# Patient Record
Sex: MALE | Race: WHITE | NOT HISPANIC OR LATINO | URBAN - METROPOLITAN AREA
[De-identification: names, ages, dates, MRNs, and addresses within clinical notes are randomized per-mention and may not be internally consistent; named-entity substitution may affect disease eponyms.]

---

## 2017-05-04 ENCOUNTER — INPATIENT (INPATIENT)
Facility: HOSPITAL | Age: 77
LOS: 4 days | Discharge: HOME HEALTH | DRG: 603 | End: 2017-05-09
Attending: HOSPITALIST | Admitting: HOSPITALIST
Payer: MEDICARE

## 2017-05-04 VITALS
WEIGHT: 250 LBS | HEART RATE: 60 BPM | RESPIRATION RATE: 20 BRPM | TEMPERATURE: 97 F | HEIGHT: 74 IN | SYSTOLIC BLOOD PRESSURE: 187 MMHG | DIASTOLIC BLOOD PRESSURE: 80 MMHG | OXYGEN SATURATION: 99 %

## 2017-05-04 PROBLEM — Z00.00 ENCOUNTER FOR PREVENTIVE HEALTH EXAMINATION: Status: ACTIVE | Noted: 2017-05-04

## 2017-05-04 LAB
ALBUMIN SERPL ELPH-MCNC: 3.9 G/DL — SIGNIFICANT CHANGE UP (ref 3.3–5.2)
ALP SERPL-CCNC: 121 U/L — HIGH (ref 40–120)
ALT FLD-CCNC: 46 U/L — HIGH
ANION GAP SERPL CALC-SCNC: 11 MMOL/L — SIGNIFICANT CHANGE UP (ref 5–17)
APTT BLD: 35.5 SEC — SIGNIFICANT CHANGE UP (ref 27.5–37.4)
AST SERPL-CCNC: 51 U/L — HIGH
BILIRUB SERPL-MCNC: 1.2 MG/DL — SIGNIFICANT CHANGE UP (ref 0.4–2)
BUN SERPL-MCNC: 24 MG/DL — HIGH (ref 8–20)
CALCIUM SERPL-MCNC: 9 MG/DL — SIGNIFICANT CHANGE UP (ref 8.6–10.2)
CHLORIDE SERPL-SCNC: 99 MMOL/L — SIGNIFICANT CHANGE UP (ref 98–107)
CO2 SERPL-SCNC: 27 MMOL/L — SIGNIFICANT CHANGE UP (ref 22–29)
CREAT SERPL-MCNC: 1.32 MG/DL — HIGH (ref 0.5–1.3)
GLUCOSE SERPL-MCNC: 97 MG/DL — SIGNIFICANT CHANGE UP (ref 70–115)
HCT VFR BLD CALC: 43.7 % — SIGNIFICANT CHANGE UP (ref 42–52)
HGB BLD-MCNC: 15 G/DL — SIGNIFICANT CHANGE UP (ref 14–18)
INR BLD: 1.18 RATIO — HIGH (ref 0.88–1.16)
MCHC RBC-ENTMCNC: 33.6 PG — HIGH (ref 27–31)
MCHC RBC-ENTMCNC: 34.3 G/DL — SIGNIFICANT CHANGE UP (ref 32–36)
MCV RBC AUTO: 97.8 FL — HIGH (ref 80–94)
NT-PROBNP SERPL-SCNC: 1518 PG/ML — HIGH (ref 0–300)
PLATELET # BLD AUTO: 110 K/UL — LOW (ref 150–400)
POTASSIUM SERPL-MCNC: 5.2 MMOL/L — SIGNIFICANT CHANGE UP (ref 3.5–5.3)
POTASSIUM SERPL-SCNC: 5.2 MMOL/L — SIGNIFICANT CHANGE UP (ref 3.5–5.3)
PROT SERPL-MCNC: 7.5 G/DL — SIGNIFICANT CHANGE UP (ref 6.6–8.7)
PROTHROM AB SERPL-ACNC: 13 SEC — HIGH (ref 9.8–12.7)
RBC # BLD: 4.47 M/UL — LOW (ref 4.6–6.2)
RBC # FLD: 14 % — SIGNIFICANT CHANGE UP (ref 11–15.6)
SODIUM SERPL-SCNC: 137 MMOL/L — SIGNIFICANT CHANGE UP (ref 135–145)
WBC # BLD: 9.4 K/UL — SIGNIFICANT CHANGE UP (ref 4.8–10.8)
WBC # FLD AUTO: 9.4 K/UL — SIGNIFICANT CHANGE UP (ref 4.8–10.8)

## 2017-05-04 PROCEDURE — 93971 EXTREMITY STUDY: CPT | Mod: 26,LT

## 2017-05-04 PROCEDURE — 71020: CPT | Mod: 26

## 2017-05-04 PROCEDURE — 73590 X-RAY EXAM OF LOWER LEG: CPT | Mod: 26,LT

## 2017-05-04 PROCEDURE — 99285 EMERGENCY DEPT VISIT HI MDM: CPT

## 2017-05-04 RX ORDER — SODIUM CHLORIDE 9 MG/ML
3 INJECTION INTRAMUSCULAR; INTRAVENOUS; SUBCUTANEOUS EVERY 8 HOURS
Qty: 0 | Refills: 0 | Status: DISCONTINUED | OUTPATIENT
Start: 2017-05-04 | End: 2017-05-09

## 2017-05-04 RX ORDER — VANCOMYCIN HCL 1 G
1000 VIAL (EA) INTRAVENOUS ONCE
Qty: 0 | Refills: 0 | Status: COMPLETED | OUTPATIENT
Start: 2017-05-04 | End: 2017-05-05

## 2017-05-04 RX ADMIN — SODIUM CHLORIDE 3 MILLILITER(S): 9 INJECTION INTRAMUSCULAR; INTRAVENOUS; SUBCUTANEOUS at 20:48

## 2017-05-04 NOTE — ED PROVIDER NOTE - MUSCULOSKELETAL, MLM
LLE from knee down is swollen and tender, increased warmth in anterior aspect of leg, + pulses, slightly delayed cap refill, posterior calf tenderness

## 2017-05-04 NOTE — ED PROVIDER NOTE - OBJECTIVE STATEMENT
75 y/o M presents to the ED c/o constant lower L leg swelling, pain and redness that began 6 days ago. Pt denies trauma or injury to the area, he states that he woke up with his sx. Pt states he sought PMD evaluation of his sx and prescribed abx which he has taken without any relief in his sx. He reports having outpt Doppler of his L leg 4 days ago which found no blood clots. Pt reports some SOB as well. Denies fever, chills, abd pain, n/v/d, HA, blurred vision, CP. Denies hx of CHF. Pt has not had outpt XR of leg. No further complaints at this time. PMD is Dr. Marcelino. 75 y/o M presents to the ED c/o constant lower L leg swelling, pain and redness that began 6 days ago. Pt denies trauma or injury to the area, he states that he woke up with his sx. Pt states he sought PMD evaluation of his sx and prescribed abx which he has taken without any relief in his sx. He reports having outpt Doppler of his L leg 4 days ago which found no blood clots. Pt reports some SOB as well. Denies fever, chills, abd pain, n/v/d, HA, blurred vision, CP. Denies hx of CHF. Pt has not had outpt XR of leg. Pt states that he is currently on blood thinners. No further complaints at this time. PMD is Dr. Marcelino.

## 2017-05-04 NOTE — ED PROVIDER NOTE - NS ED MD SCRIBE ATTENDING SCRIBE SECTIONS
PHYSICAL EXAM/DISPOSITION/HISTORY OF PRESENT ILLNESS/REVIEW OF SYSTEMS/PAST MEDICAL/SURGICAL/SOCIAL HISTORY/VITAL SIGNS( [place])

## 2017-05-04 NOTE — ED PROVIDER NOTE - MEDICAL DECISION MAKING DETAILS
Will check labs, Doppler of leg and re-evaluate Will check labs, Doppler of leg and re-evaluate   pt failing outpt antibiotics will need admission and workup for possible chf

## 2017-05-05 DIAGNOSIS — L03.90 CELLULITIS, UNSPECIFIED: ICD-10-CM

## 2017-05-05 DIAGNOSIS — E03.9 HYPOTHYROIDISM, UNSPECIFIED: ICD-10-CM

## 2017-05-05 DIAGNOSIS — I48.91 UNSPECIFIED ATRIAL FIBRILLATION: ICD-10-CM

## 2017-05-05 DIAGNOSIS — L03.116 CELLULITIS OF LEFT LOWER LIMB: ICD-10-CM

## 2017-05-05 DIAGNOSIS — Z29.9 ENCOUNTER FOR PROPHYLACTIC MEASURES, UNSPECIFIED: ICD-10-CM

## 2017-05-05 DIAGNOSIS — I10 ESSENTIAL (PRIMARY) HYPERTENSION: ICD-10-CM

## 2017-05-05 DIAGNOSIS — M1A.9XX0 CHRONIC GOUT, UNSPECIFIED, WITHOUT TOPHUS (TOPHI): ICD-10-CM

## 2017-05-05 DIAGNOSIS — I50.9 HEART FAILURE, UNSPECIFIED: ICD-10-CM

## 2017-05-05 PROCEDURE — 99223 1ST HOSP IP/OBS HIGH 75: CPT

## 2017-05-05 RX ORDER — APIXABAN 2.5 MG/1
5 TABLET, FILM COATED ORAL
Qty: 0 | Refills: 0 | Status: DISCONTINUED | OUTPATIENT
Start: 2017-05-05 | End: 2017-05-09

## 2017-05-05 RX ORDER — VANCOMYCIN HCL 1 G
1000 VIAL (EA) INTRAVENOUS EVERY 12 HOURS
Qty: 0 | Refills: 0 | Status: DISCONTINUED | OUTPATIENT
Start: 2017-05-05 | End: 2017-05-05

## 2017-05-05 RX ORDER — ACETAMINOPHEN 500 MG
650 TABLET ORAL EVERY 6 HOURS
Qty: 0 | Refills: 0 | Status: DISCONTINUED | OUTPATIENT
Start: 2017-05-05 | End: 2017-05-09

## 2017-05-05 RX ORDER — LACTOBACILLUS ACIDOPHILUS 100MM CELL
1 CAPSULE ORAL
Qty: 0 | Refills: 0 | Status: DISCONTINUED | OUTPATIENT
Start: 2017-05-05 | End: 2017-05-09

## 2017-05-05 RX ORDER — LEVOTHYROXINE SODIUM 125 MCG
50 TABLET ORAL DAILY
Qty: 0 | Refills: 0 | Status: DISCONTINUED | OUTPATIENT
Start: 2017-05-05 | End: 2017-05-09

## 2017-05-05 RX ORDER — LINEZOLID 600 MG/300ML
600 INJECTION, SOLUTION INTRAVENOUS EVERY 12 HOURS
Qty: 0 | Refills: 0 | Status: DISCONTINUED | OUTPATIENT
Start: 2017-05-05 | End: 2017-05-09

## 2017-05-05 RX ORDER — ONDANSETRON 8 MG/1
4 TABLET, FILM COATED ORAL EVERY 6 HOURS
Qty: 0 | Refills: 0 | Status: DISCONTINUED | OUTPATIENT
Start: 2017-05-05 | End: 2017-05-05

## 2017-05-05 RX ORDER — ALLOPURINOL 300 MG
100 TABLET ORAL
Qty: 0 | Refills: 0 | Status: DISCONTINUED | OUTPATIENT
Start: 2017-05-05 | End: 2017-05-09

## 2017-05-05 RX ORDER — ATENOLOL 25 MG/1
50 TABLET ORAL DAILY
Qty: 0 | Refills: 0 | Status: DISCONTINUED | OUTPATIENT
Start: 2017-05-05 | End: 2017-05-09

## 2017-05-05 RX ORDER — SOTALOL HCL 120 MG
80 TABLET ORAL
Qty: 0 | Refills: 0 | Status: DISCONTINUED | OUTPATIENT
Start: 2017-05-05 | End: 2017-05-09

## 2017-05-05 RX ORDER — LOSARTAN POTASSIUM 100 MG/1
50 TABLET, FILM COATED ORAL DAILY
Qty: 0 | Refills: 0 | Status: DISCONTINUED | OUTPATIENT
Start: 2017-05-05 | End: 2017-05-06

## 2017-05-05 RX ORDER — DILTIAZEM HCL 120 MG
180 CAPSULE, EXT RELEASE 24 HR ORAL DAILY
Qty: 0 | Refills: 0 | Status: DISCONTINUED | OUTPATIENT
Start: 2017-05-05 | End: 2017-05-09

## 2017-05-05 RX ORDER — IBUPROFEN 200 MG
400 TABLET ORAL THREE TIMES A DAY
Qty: 0 | Refills: 0 | Status: DISCONTINUED | OUTPATIENT
Start: 2017-05-05 | End: 2017-05-06

## 2017-05-05 RX ORDER — VANCOMYCIN HCL 1 G
1000 VIAL (EA) INTRAVENOUS EVERY 8 HOURS
Qty: 0 | Refills: 0 | Status: DISCONTINUED | OUTPATIENT
Start: 2017-05-05 | End: 2017-05-05

## 2017-05-05 RX ADMIN — Medication 100 MILLIGRAM(S): at 18:26

## 2017-05-05 RX ADMIN — LINEZOLID 600 MILLIGRAM(S): 600 INJECTION, SOLUTION INTRAVENOUS at 18:26

## 2017-05-05 RX ADMIN — Medication 80 MILLIGRAM(S): at 18:27

## 2017-05-05 RX ADMIN — SODIUM CHLORIDE 3 MILLILITER(S): 9 INJECTION INTRAMUSCULAR; INTRAVENOUS; SUBCUTANEOUS at 21:48

## 2017-05-05 RX ADMIN — Medication 80 MILLIGRAM(S): at 05:21

## 2017-05-05 RX ADMIN — APIXABAN 5 MILLIGRAM(S): 2.5 TABLET, FILM COATED ORAL at 05:21

## 2017-05-05 RX ADMIN — Medication 50 MICROGRAM(S): at 05:21

## 2017-05-05 RX ADMIN — Medication 100 MILLIGRAM(S): at 09:40

## 2017-05-05 RX ADMIN — SODIUM CHLORIDE 3 MILLILITER(S): 9 INJECTION INTRAMUSCULAR; INTRAVENOUS; SUBCUTANEOUS at 14:36

## 2017-05-05 RX ADMIN — ATENOLOL 50 MILLIGRAM(S): 25 TABLET ORAL at 05:21

## 2017-05-05 RX ADMIN — Medication 1 TABLET(S): at 09:40

## 2017-05-05 RX ADMIN — SODIUM CHLORIDE 3 MILLILITER(S): 9 INJECTION INTRAMUSCULAR; INTRAVENOUS; SUBCUTANEOUS at 05:18

## 2017-05-05 RX ADMIN — APIXABAN 5 MILLIGRAM(S): 2.5 TABLET, FILM COATED ORAL at 18:25

## 2017-05-05 RX ADMIN — Medication 250 MILLIGRAM(S): at 00:53

## 2017-05-05 RX ADMIN — Medication 180 MILLIGRAM(S): at 05:21

## 2017-05-05 RX ADMIN — LOSARTAN POTASSIUM 50 MILLIGRAM(S): 100 TABLET, FILM COATED ORAL at 05:21

## 2017-05-05 RX ADMIN — Medication 1 TABLET(S): at 18:26

## 2017-05-05 NOTE — H&P ADULT - HISTORY OF PRESENT ILLNESS
77 y/o male with history of Afib on A/C, HTN, HLD, Gout, Hypothyroidism, remote history of colon cancer s/p surgery/chemo 6 years ago. Patient saw PMD 6 days ago with left leg redness, warmth, pain and was told he has a cellulitis. He was given Cleocin which he has been taking but has not noticed any improvement. He denies any fever, chills, N/V, SOB, CP. He denies any known history of MRSA infection. In the ED patient with evidence of left leg cellulitis, not sepsis criteria. O/P xray and U/S showed no FB or DVT.

## 2017-05-05 NOTE — H&P ADULT - PMH
Atrial fibrillation, unspecified type    Chronic gout, unspecified cause, unspecified site    Essential hypertension

## 2017-05-05 NOTE — H&P ADULT - NEUROLOGICAL DETAILS
responds to pain/responds to verbal commands/alert and oriented x 3/sensation intact/cranial nerves intact

## 2017-05-05 NOTE — PROGRESS NOTE ADULT - SUBJECTIVE AND OBJECTIVE BOX
Patient is a 76y old  Male who presents with a chief complaint of left leg pain (05 May 2017 01:52)      HPI:  75 y/o male with history of Afib on A/C, HTN, HLD, Gout, Hypothyroidism, remote history of colon cancer s/p surgery/chemo 6 years ago. Patient saw PMD 6 days ago with left leg redness, warmth, pain and was told he has a cellulitis. He was given Cleocin which he has been taking but has not noticed any improvement. He denies any fever, chills, N/V, SOB, CP. He denies any known history of MRSA infection. In the ED patient with evidence of left leg cellulitis, not sepsis criteria. O/P xray and U/S showed no FB or DVT.      Overnight interval:  L leg pain subsided considerably, but + swelling unchanged  Pt.denies chills/fever, SOB, CP, palpitations, diaphoresis, abd.pain, N/V/D/C, ha/dizziness, and rest of ROS negative          FAMILY HISTORY:  No pertinent family history in first degree relatives      PAST MEDICAL & SURGICAL HISTORY:  Essential hypertension  Chronic gout, unspecified cause, unspecified site  Atrial fibrillation, unspecified type  No significant past surgical history      Allergies    No Known Allergies      Vital Signs Last 24 Hrs  T(C): 36.6, Max: 36.9 (05-05 @ 01:52)  T(F): 97.9, Max: 98.4 (05-05 @ 01:52)  HR: 61 (60 - 102)  BP: 151/71 (145/70 - 187/80)  BP(mean): 115 (115 - 115)  RR: 18 (16 - 20)  SpO2: 98% (98% - 100%)          LABS:                        15.0   9.4   )-----------( 110      ( 04 May 2017 21:01 )             43.7     LIVER FUNCTIONS - ( 04 May 2017 21:01 )  Alb: 3.9 g/dL / Pro: 7.5 g/dL / ALK PHOS: 121 U/L / ALT: 46 U/L / AST: 51 U/L / GGT: x           PT/INR - ( 04 May 2017 21:01 )   PT: 13.0 sec;   INR: 1.18 ratio         PTT - ( 04 May 2017 21:01 )  PTT:35.5 sec      RADIOLOGY & ADDITIONAL STUDIES:    US DPLX LWR EXT VEINS LTD LT      No evidence of left lower extremity deep vein thrombosis.       TIBIA FIBULA-LEFT         No acute osseous abnormality.      CHEST P.A. LATERAL    No acute cardiopulmonary disease process.    MEDICATIONS:  sodium chloride 0.9% lock flush 3milliLiter(s) IV Push every 8 hours  lactobacillus acidophilus 1Tablet(s) Oral two times a day with meals  apixaban 5milliGRAM(s) Oral two times a day  allopurinol 100milliGRAM(s) Oral two times a day after meals  losartan 50milliGRAM(s) Oral daily  sotalol 80milliGRAM(s) Oral two times a day  diltiazem   CD 180milliGRAM(s) Oral daily  levothyroxine 50MICROGram(s) Oral daily  ATENolol  Tablet 50milliGRAM(s) Oral daily  acetaminophen   Tablet 650milliGRAM(s) Oral every 6 hours PRN  linezolid    Tablet 600milliGRAM(s) Oral every 12 hours

## 2017-05-05 NOTE — PROGRESS NOTE ADULT - PROBLEM SELECTOR PLAN 1
Failed outpt Clindamycin tx  Vancomycin 1000mg IVPB Q12H  Acidophillus PO BID   Keep toes dry and clean  Ambulation  OOB  Duplex LLE-- No evidence of left lower extremity deep vein thrombosis  Monitor vitals (afebrile T=98.4, WBC= 9.4, no drainage from LE  Monitor CBC  Tylenol PRN-- pain  MotrinPRN-- pain Failed outpt Clindamycin tx  Vancomycin 1000mg IVPB Q12H  Acidophillus PO BID   ID Consult  Keep toes dry and clean  Ambulation  OOB  Duplex LLE-- No evidence of left lower extremity deep vein thrombosis  Monitor vitals (afebrile T=98.4, WBC= 9.4, no drainage from LE  Monitor CBC  Tylenol PRN-- pain  Motrin PRN-- pain  Blood c&s Failed outpt PO Clindamycin tx  Vancomycin 1000mg IVPB Q12H ---- D/Shaq   Linezolid 600mg PO [id number] (5-7 days)   Pt. instructed to avoid cheese, and wine while on abx  Acidophillus PO BID   ID Consult appreciated  Keep toes dry and clean  Ambulation  OOB  Duplex LLE-- No evidence of left lower extremity deep vein thrombosis  Monitor vitals (afebrile T=98.4, WBC= 9.4, no drainage from LE  Monitor CBC  Tylenol PRN-- pain  Motrin PRN-- pain  Blood c&s -- pending

## 2017-05-05 NOTE — H&P ADULT - PROBLEM SELECTOR PLAN 1
Patient failed treatment with Clindamycin c/f MRSA. No drainage to cultures, no WBC or fever. Cont. with Vancomycin. No reason to suspect Gout flair. likely cause is onychomycosis leading to skin breakdown and translocation of skin reynold. Cont. Probiotics, Ambulation, OOB. Monitor temp/wbc.

## 2017-05-05 NOTE — PROGRESS NOTE ADULT - EXTREMITIES COMMENTS
LLE edema 2+/3+, L shin cellulitis extending to upper leg+erythema LLE, + warmth, + sensitivity, ROM BLE intact  + resolving bruising L foot/medial ankle spread into the foot arch, +DP  trophic skin changes RLE, overly dry, erytematous B/L toes, R>L  B/L onychomychosis

## 2017-05-05 NOTE — PROGRESS NOTE ADULT - PROBLEM SELECTOR PLAN 7
DVT prx-- Eliquis 5mg PO BID DVT prx-- Eliquis 5mg PO BID  Keep toes dry and clean  Ambulation  OOB  Tylenol/Motrin PRN for pain

## 2017-05-05 NOTE — ED ADULT NURSE REASSESSMENT NOTE - NS ED NURSE REASSESS COMMENT FT1
Patient received at 0700; awake; alert and oriented x4. Denies any pain or discomfort at this time. redness to LLE noted, + pulse.  Denies SOB, dizziness. No distress noted. VSS. Respirations unlabored. Report received at bedside.  Call bell and personal items in reach. Continue to monitor patient and maintain safety.

## 2017-05-05 NOTE — CONSULT NOTE ADULT - PROBLEM SELECTOR RECOMMENDATION 9
Blood cultures pending  No fever or leukocytosis  Cellulitis of LLE on exam  follow up blood cultures  will D/C Vancomycin  Start Linezolid 600mg PO [id number]  This will also help to decrease toxin production  Anticipate 5-7 days  will D/C Zofran since patient is not using it or nauseous   No other Linezolid interaction  Patient advised to avoid Wine and cheese on Linezolid  radiology images reviewed

## 2017-05-06 LAB
ANION GAP SERPL CALC-SCNC: 11 MMOL/L — SIGNIFICANT CHANGE UP (ref 5–17)
BUN SERPL-MCNC: 26 MG/DL — HIGH (ref 8–20)
CALCIUM SERPL-MCNC: 8.2 MG/DL — LOW (ref 8.6–10.2)
CHLORIDE SERPL-SCNC: 105 MMOL/L — SIGNIFICANT CHANGE UP (ref 98–107)
CO2 SERPL-SCNC: 25 MMOL/L — SIGNIFICANT CHANGE UP (ref 22–29)
CREAT SERPL-MCNC: 1.38 MG/DL — HIGH (ref 0.5–1.3)
GLUCOSE SERPL-MCNC: 97 MG/DL — SIGNIFICANT CHANGE UP (ref 70–115)
HCT VFR BLD CALC: 38.9 % — LOW (ref 42–52)
HGB BLD-MCNC: 13.3 G/DL — LOW (ref 14–18)
MCHC RBC-ENTMCNC: 33.1 PG — HIGH (ref 27–31)
MCHC RBC-ENTMCNC: 34.2 G/DL — SIGNIFICANT CHANGE UP (ref 32–36)
MCV RBC AUTO: 96.8 FL — HIGH (ref 80–94)
PLATELET # BLD AUTO: 87 K/UL — LOW (ref 150–400)
POTASSIUM SERPL-MCNC: 3.9 MMOL/L — SIGNIFICANT CHANGE UP (ref 3.5–5.3)
POTASSIUM SERPL-SCNC: 3.9 MMOL/L — SIGNIFICANT CHANGE UP (ref 3.5–5.3)
RBC # BLD: 4.02 M/UL — LOW (ref 4.6–6.2)
RBC # FLD: 13.7 % — SIGNIFICANT CHANGE UP (ref 11–15.6)
SODIUM SERPL-SCNC: 141 MMOL/L — SIGNIFICANT CHANGE UP (ref 135–145)
WBC # BLD: 8.1 K/UL — SIGNIFICANT CHANGE UP (ref 4.8–10.8)
WBC # FLD AUTO: 8.1 K/UL — SIGNIFICANT CHANGE UP (ref 4.8–10.8)

## 2017-05-06 PROCEDURE — 99233 SBSQ HOSP IP/OBS HIGH 50: CPT

## 2017-05-06 RX ORDER — SODIUM CHLORIDE 9 MG/ML
1000 INJECTION, SOLUTION INTRAVENOUS
Qty: 0 | Refills: 0 | Status: DISCONTINUED | OUTPATIENT
Start: 2017-05-06 | End: 2017-05-09

## 2017-05-06 RX ORDER — HYDRALAZINE HCL 50 MG
10 TABLET ORAL EVERY 6 HOURS
Qty: 0 | Refills: 0 | Status: DISCONTINUED | OUTPATIENT
Start: 2017-05-06 | End: 2017-05-09

## 2017-05-06 RX ADMIN — Medication 50 MICROGRAM(S): at 05:59

## 2017-05-06 RX ADMIN — Medication 100 MILLIGRAM(S): at 20:56

## 2017-05-06 RX ADMIN — Medication 100 MILLIGRAM(S): at 13:18

## 2017-05-06 RX ADMIN — SODIUM CHLORIDE 3 MILLILITER(S): 9 INJECTION INTRAMUSCULAR; INTRAVENOUS; SUBCUTANEOUS at 06:21

## 2017-05-06 RX ADMIN — APIXABAN 5 MILLIGRAM(S): 2.5 TABLET, FILM COATED ORAL at 17:03

## 2017-05-06 RX ADMIN — ATENOLOL 50 MILLIGRAM(S): 25 TABLET ORAL at 05:59

## 2017-05-06 RX ADMIN — SODIUM CHLORIDE 3 MILLILITER(S): 9 INJECTION INTRAMUSCULAR; INTRAVENOUS; SUBCUTANEOUS at 23:51

## 2017-05-06 RX ADMIN — Medication 180 MILLIGRAM(S): at 05:59

## 2017-05-06 RX ADMIN — LINEZOLID 600 MILLIGRAM(S): 600 INJECTION, SOLUTION INTRAVENOUS at 17:03

## 2017-05-06 RX ADMIN — LINEZOLID 600 MILLIGRAM(S): 600 INJECTION, SOLUTION INTRAVENOUS at 05:58

## 2017-05-06 RX ADMIN — Medication 1 TABLET(S): at 10:11

## 2017-05-06 RX ADMIN — APIXABAN 5 MILLIGRAM(S): 2.5 TABLET, FILM COATED ORAL at 05:59

## 2017-05-06 RX ADMIN — Medication 1 TABLET(S): at 17:03

## 2017-05-06 RX ADMIN — Medication 80 MILLIGRAM(S): at 17:03

## 2017-05-06 RX ADMIN — SODIUM CHLORIDE 3 MILLILITER(S): 9 INJECTION INTRAMUSCULAR; INTRAVENOUS; SUBCUTANEOUS at 13:18

## 2017-05-06 RX ADMIN — LOSARTAN POTASSIUM 50 MILLIGRAM(S): 100 TABLET, FILM COATED ORAL at 05:59

## 2017-05-06 RX ADMIN — Medication 80 MILLIGRAM(S): at 05:58

## 2017-05-06 NOTE — PROGRESS NOTE ADULT - SUBJECTIVE AND OBJECTIVE BOX
CHIEF COMPLAINT/INTERVAL HISTORY:    Patient is a 76y old  Male who presents with a chief complaint of left leg pain (05 May 2017 01:52)      HPI:  77 y/o male with history of Afib on A/C, HTN, HLD, Gout, Hypothyroidism, remote history of colon cancer s/p surgery/chemo 6 years ago. Patient saw PMD 6 days ago with left leg redness, warmth, pain and was told he has a cellulitis. He was given Cleocin which he has been taking but has not noticed any improvement. He denies any fever, chills, N/V, SOB, CP. He denies any known history of MRSA infection. In the ED patient with evidence of left leg cellulitis, not sepsis criteria. O/P xray and U/S showed no FB or DVT. (05 May 2017 01:52)      SUBJECTIVE & OBJECTIVE: Pt seen and examined at bedside. Leg swelling & erythema  appears better per pt    ICU Vital Signs Last 24 Hrs  T(C): 36.2, Max: 36.6 (05-06 @ 00:13)  T(F): 97.1, Max: 97.9 (05-06 @ 00:13)  HR: 62 (60 - 62)  BP: 143/75 (121/65 - 152/67)  BP(mean): --  ABP: --  ABP(mean): --  RR: 18 (18 - 18)  SpO2: 98% (98% - 100%)        MEDICATIONS  (STANDING):  sodium chloride 0.9% lock flush 3milliLiter(s) IV Push every 8 hours  lactobacillus acidophilus 1Tablet(s) Oral two times a day with meals  apixaban 5milliGRAM(s) Oral two times a day  allopurinol 100milliGRAM(s) Oral two times a day after meals  losartan 50milliGRAM(s) Oral daily  sotalol 80milliGRAM(s) Oral two times a day  diltiazem   CD 180milliGRAM(s) Oral daily  levothyroxine 50MICROGram(s) Oral daily  ATENolol  Tablet 50milliGRAM(s) Oral daily  linezolid    Tablet 600milliGRAM(s) Oral every 12 hours  sodium chloride 0.45%. 1000milliLiter(s) IV Continuous <Continuous>    MEDICATIONS  (PRN):  acetaminophen   Tablet 650milliGRAM(s) Oral every 6 hours PRN For Temp greater than 38.5 C (101.3 F)      LABS:                        13.3   8.1   )-----------( 87       ( 06 May 2017 06:33 )             38.9     05-06    141  |  105  |  26.0<H>  ----------------------------<  97  3.9   |  25.0  |  1.38<H>    Ca    8.2<L>      06 May 2017 06:36    TPro  7.5  /  Alb  3.9  /  TBili  1.2  /  DBili  x   /  AST  51<H>  /  ALT  46<H>  /  AlkPhos  121<H>  05-04    PT/INR - ( 04 May 2017 21:01 )   PT: 13.0 sec;   INR: 1.18 ratio         PTT - ( 04 May 2017 21:01 )  PTT:35.5 sec      CAPILLARY BLOOD GLUCOSE      RECENT CULTURES:      RADIOLOGY & ADDITIONAL TESTS:      PHYSICAL EXAM:    GENERAL: NAD, well-groomed, well-developed  HEAD:  Atraumatic, Normocephalic  EYES: EOMI, PERRLA, conjunctiva and sclera clear  ENMT: Moist mucous membranes  NECK: Supple, No JVD  NERVOUS SYSTEM:  Alert & Oriented X3, Motor Strength 5/5 B/L upper and lower extremities; DTRs 2+ intact and symmetric  CHEST/LUNG: Clear to auscultation bilaterally; No rales, rhonchi, wheezing, or rubs,  Rt chest wall Port and left chest wall PPM  HEART: Regular rate and rhythm; No murmurs, rubs, or gallops  ABDOMEN: Soft, Nontender, Nondistended; Bowel sounds present  EXTREMITIES:  2+ Peripheral Pulses,  LLE with erythema, warmth, swelling

## 2017-05-06 NOTE — PROGRESS NOTE ADULT - PROBLEM SELECTOR PLAN 1
Failed outpt PO Clindamycin tx  Vancomycin 1000mg IVPB Q12H ---- D/Shaq   Linezolid 600mg PO [id number] (5-7 days)   Pt. instructed to avoid cheese, and wine while on abx  Acidophillus PO BID   ID Consult appreciated  Keep toes dry and clean  Ambulation  OOB  Duplex LLE-- No evidence of left lower extremity deep vein thrombosis  Tylenol PRN-- pain  Blood c&s -- pending

## 2017-05-07 LAB
ANION GAP SERPL CALC-SCNC: 10 MMOL/L — SIGNIFICANT CHANGE UP (ref 5–17)
BASOPHILS # BLD AUTO: 0 K/UL — SIGNIFICANT CHANGE UP (ref 0–0.2)
BASOPHILS NFR BLD AUTO: 0.4 % — SIGNIFICANT CHANGE UP (ref 0–2)
BUN SERPL-MCNC: 25 MG/DL — HIGH (ref 8–20)
CALCIUM SERPL-MCNC: 8.5 MG/DL — LOW (ref 8.6–10.2)
CHLORIDE SERPL-SCNC: 101 MMOL/L — SIGNIFICANT CHANGE UP (ref 98–107)
CK SERPL-CCNC: 48 U/L — SIGNIFICANT CHANGE UP (ref 30–200)
CO2 SERPL-SCNC: 26 MMOL/L — SIGNIFICANT CHANGE UP (ref 22–29)
CREAT SERPL-MCNC: 1.59 MG/DL — HIGH (ref 0.5–1.3)
EOSINOPHIL # BLD AUTO: 0.2 K/UL — SIGNIFICANT CHANGE UP (ref 0–0.5)
EOSINOPHIL NFR BLD AUTO: 3 % — SIGNIFICANT CHANGE UP (ref 0–5)
GLUCOSE SERPL-MCNC: 86 MG/DL — SIGNIFICANT CHANGE UP (ref 70–115)
HCT VFR BLD CALC: 38.5 % — LOW (ref 42–52)
HGB BLD-MCNC: 13.2 G/DL — LOW (ref 14–18)
LYMPHOCYTES # BLD AUTO: 3.4 K/UL — SIGNIFICANT CHANGE UP (ref 1–4.8)
LYMPHOCYTES # BLD AUTO: 47.1 % — SIGNIFICANT CHANGE UP (ref 20–55)
MAGNESIUM SERPL-MCNC: 2.1 MG/DL — SIGNIFICANT CHANGE UP (ref 1.8–2.5)
MCHC RBC-ENTMCNC: 33.2 PG — HIGH (ref 27–31)
MCHC RBC-ENTMCNC: 34.3 G/DL — SIGNIFICANT CHANGE UP (ref 32–36)
MCV RBC AUTO: 96.7 FL — HIGH (ref 80–94)
MONOCYTES # BLD AUTO: 0.5 K/UL — SIGNIFICANT CHANGE UP (ref 0–0.8)
MONOCYTES NFR BLD AUTO: 7 % — SIGNIFICANT CHANGE UP (ref 3–10)
NEUTROPHILS # BLD AUTO: 3.1 K/UL — SIGNIFICANT CHANGE UP (ref 1.8–8)
NEUTROPHILS NFR BLD AUTO: 42.4 % — SIGNIFICANT CHANGE UP (ref 37–73)
PHOSPHATE SERPL-MCNC: 2.4 MG/DL — SIGNIFICANT CHANGE UP (ref 2.4–4.7)
PLATELET # BLD AUTO: 82 K/UL — LOW (ref 150–400)
POTASSIUM SERPL-MCNC: 3.9 MMOL/L — SIGNIFICANT CHANGE UP (ref 3.5–5.3)
POTASSIUM SERPL-SCNC: 3.9 MMOL/L — SIGNIFICANT CHANGE UP (ref 3.5–5.3)
RBC # BLD: 3.98 M/UL — LOW (ref 4.6–6.2)
RBC # FLD: 13.8 % — SIGNIFICANT CHANGE UP (ref 11–15.6)
SODIUM SERPL-SCNC: 137 MMOL/L — SIGNIFICANT CHANGE UP (ref 135–145)
WBC # BLD: 7.3 K/UL — SIGNIFICANT CHANGE UP (ref 4.8–10.8)
WBC # FLD AUTO: 7.3 K/UL — SIGNIFICANT CHANGE UP (ref 4.8–10.8)

## 2017-05-07 PROCEDURE — 99233 SBSQ HOSP IP/OBS HIGH 50: CPT

## 2017-05-07 PROCEDURE — 76775 US EXAM ABDO BACK WALL LIM: CPT | Mod: 26

## 2017-05-07 RX ADMIN — APIXABAN 5 MILLIGRAM(S): 2.5 TABLET, FILM COATED ORAL at 21:04

## 2017-05-07 RX ADMIN — Medication 100 MILLIGRAM(S): at 17:47

## 2017-05-07 RX ADMIN — Medication 80 MILLIGRAM(S): at 17:47

## 2017-05-07 RX ADMIN — Medication 100 MILLIGRAM(S): at 08:18

## 2017-05-07 RX ADMIN — SODIUM CHLORIDE 50 MILLILITER(S): 9 INJECTION, SOLUTION INTRAVENOUS at 17:47

## 2017-05-07 RX ADMIN — SODIUM CHLORIDE 50 MILLILITER(S): 9 INJECTION, SOLUTION INTRAVENOUS at 01:47

## 2017-05-07 RX ADMIN — Medication 180 MILLIGRAM(S): at 05:37

## 2017-05-07 RX ADMIN — Medication 80 MILLIGRAM(S): at 05:44

## 2017-05-07 RX ADMIN — LINEZOLID 600 MILLIGRAM(S): 600 INJECTION, SOLUTION INTRAVENOUS at 17:47

## 2017-05-07 RX ADMIN — SODIUM CHLORIDE 3 MILLILITER(S): 9 INJECTION INTRAMUSCULAR; INTRAVENOUS; SUBCUTANEOUS at 13:27

## 2017-05-07 RX ADMIN — LINEZOLID 600 MILLIGRAM(S): 600 INJECTION, SOLUTION INTRAVENOUS at 05:36

## 2017-05-07 RX ADMIN — Medication 50 MICROGRAM(S): at 05:37

## 2017-05-07 RX ADMIN — ATENOLOL 50 MILLIGRAM(S): 25 TABLET ORAL at 05:37

## 2017-05-07 RX ADMIN — Medication 1 TABLET(S): at 17:47

## 2017-05-07 RX ADMIN — SODIUM CHLORIDE 3 MILLILITER(S): 9 INJECTION INTRAMUSCULAR; INTRAVENOUS; SUBCUTANEOUS at 20:55

## 2017-05-07 RX ADMIN — Medication 1 TABLET(S): at 08:18

## 2017-05-07 RX ADMIN — APIXABAN 5 MILLIGRAM(S): 2.5 TABLET, FILM COATED ORAL at 05:36

## 2017-05-07 RX ADMIN — SODIUM CHLORIDE 3 MILLILITER(S): 9 INJECTION INTRAMUSCULAR; INTRAVENOUS; SUBCUTANEOUS at 05:45

## 2017-05-07 NOTE — PATIENT PROFILE ADULT. - NS TRANSFER PATIENT BELONGINGS
Jewelry/Money (specify)/Clothing/yellow metal chain with yellow metal police badge charm/Wrist Watch

## 2017-05-07 NOTE — PROGRESS NOTE ADULT - PROBLEM SELECTOR PLAN 1
Failed outpt PO Clindamycin tx  Vancomycin 1000mg IVPB Q12H ---- D/Shaq   Linezolid 600mg PO [id number] (5-7 days)   Pt. instructed to avoid cheese, and wine while on abx  Acidophillus PO BID   ID Consult appreciated  Keep toes dry and clean  Ambulation  OOB  Duplex LLE-- No evidence of left lower extremity deep vein thrombosis  Tylenol PRN-- pain  Blood c&s -- pending

## 2017-05-07 NOTE — PROGRESS NOTE ADULT - SUBJECTIVE AND OBJECTIVE BOX
CHIEF COMPLAINT/INTERVAL HISTORY:    Patient is a 76y old  Male who presents with a chief complaint of left leg pain (05 May 2017 01:52)      HPI:  77 y/o male with history of Afib on A/C, HTN, HLD, Gout, Hypothyroidism, remote history of colon cancer s/p surgery/chemo 6 years ago. Patient saw PMD 6 days ago with left leg redness, warmth, pain and was told he has a cellulitis. He was given Cleocin which he has been taking but has not noticed any improvement. He denies any fever, chills, N/V, SOB, CP. He denies any known history of MRSA infection. In the ED patient with evidence of left leg cellulitis, not sepsis criteria. O/P xray and U/S showed no FB or DVT. (05 May 2017 01:52)      SUBJECTIVE & OBJECTIVE: Pt seen and examined at bedside. Feels better today. decreased erythema over LLE    ICU Vital Signs Last 24 Hrs  T(C): 36.5, Max: 36.8 ( @ 05:40)  T(F): 97.7, Max: 98.2 ( @ 05:40)  HR: 66 (59 - 66)  BP: 152/75 (140/70 - 155/72)  BP(mean): --  ABP: --  ABP(mean): --  RR: 18 (13 - 18)  SpO2: 97% (97% - 97%)        MEDICATIONS  (STANDING):  sodium chloride 0.9% lock flush 3milliLiter(s) IV Push every 8 hours  lactobacillus acidophilus 1Tablet(s) Oral two times a day with meals  apixaban 5milliGRAM(s) Oral two times a day  allopurinol 100milliGRAM(s) Oral two times a day after meals  sotalol 80milliGRAM(s) Oral two times a day  diltiazem   CD 180milliGRAM(s) Oral daily  levothyroxine 50MICROGram(s) Oral daily  ATENolol  Tablet 50milliGRAM(s) Oral daily  linezolid    Tablet 600milliGRAM(s) Oral every 12 hours  sodium chloride 0.45%. 1000milliLiter(s) IV Continuous <Continuous>    MEDICATIONS  (PRN):  acetaminophen   Tablet 650milliGRAM(s) Oral every 6 hours PRN For Temp greater than 38.5 C (101.3 F)  hydrALAZINE Injectable 10milliGRAM(s) IV Push every 6 hours PRN for SBP > 150      LABS:                        13.2   7.3   )-----------( 82       ( 07 May 2017 06:57 )             38.5     05-07    137  |  101  |  25.0<H>  ----------------------------<  86  3.9   |  26.0  |  1.59<H>    Ca    8.5<L>      07 May 2017 06:52  Phos  2.4     05-07  Mg     2.1     05-07            CAPILLARY BLOOD GLUCOSE      RECENT CULTURES:      RADIOLOGY & ADDITIONAL TESTS:      PHYSICAL EXAM:    GENERAL: NAD, well-groomed, well-developed  HEAD:  Atraumatic, Normocephalic  EYES: EOMI, PERRLA, conjunctiva and sclera clear  ENMT: Moist mucous membranes  NECK: Supple, No JVD  NERVOUS SYSTEM:  Alert & Oriented X3, Motor Strength 5/5 B/L upper and lower extremities; DTRs 2+ intact and symmetric  CHEST/LUNG: Clear to auscultation bilaterally; No rales, rhonchi, wheezing, or rubs,  Rt chest wall Port and left chest wall PPM  HEART: Regular rate and rhythm; No murmurs, rubs, or gallops  ABDOMEN: Soft, Nontender, Nondistended; Bowel sounds present  EXTREMITIES:  2+ Peripheral Pulses,  LLE with  erythema, warmth, swelling

## 2017-05-08 DIAGNOSIS — N40.0 BENIGN PROSTATIC HYPERPLASIA WITHOUT LOWER URINARY TRACT SYMPTOMS: ICD-10-CM

## 2017-05-08 LAB
ANION GAP SERPL CALC-SCNC: 10 MMOL/L — SIGNIFICANT CHANGE UP (ref 5–17)
BASOPHILS # BLD AUTO: 0 K/UL — SIGNIFICANT CHANGE UP (ref 0–0.2)
BASOPHILS NFR BLD AUTO: 0.1 % — SIGNIFICANT CHANGE UP (ref 0–2)
BUN SERPL-MCNC: 23 MG/DL — HIGH (ref 8–20)
CALCIUM SERPL-MCNC: 8.3 MG/DL — LOW (ref 8.6–10.2)
CHLORIDE SERPL-SCNC: 103 MMOL/L — SIGNIFICANT CHANGE UP (ref 98–107)
CHLORIDE UR-SCNC: 75 MMOL/L — SIGNIFICANT CHANGE UP
CO2 SERPL-SCNC: 26 MMOL/L — SIGNIFICANT CHANGE UP (ref 22–29)
CREAT ?TM UR-MCNC: 70 MG/DL — SIGNIFICANT CHANGE UP
CREAT SERPL-MCNC: 1.35 MG/DL — HIGH (ref 0.5–1.3)
EOSINOPHIL # BLD AUTO: 0.2 K/UL — SIGNIFICANT CHANGE UP (ref 0–0.5)
EOSINOPHIL NFR BLD AUTO: 2.5 % — SIGNIFICANT CHANGE UP (ref 0–5)
GLUCOSE SERPL-MCNC: 90 MG/DL — SIGNIFICANT CHANGE UP (ref 70–115)
HCT VFR BLD CALC: 40.9 % — LOW (ref 42–52)
HGB BLD-MCNC: 14 G/DL — SIGNIFICANT CHANGE UP (ref 14–18)
LYMPHOCYTES # BLD AUTO: 3.6 K/UL — SIGNIFICANT CHANGE UP (ref 1–4.8)
LYMPHOCYTES # BLD AUTO: 50.3 % — SIGNIFICANT CHANGE UP (ref 20–55)
MAGNESIUM SERPL-MCNC: 2.3 MG/DL — SIGNIFICANT CHANGE UP (ref 1.8–2.5)
MAGNESIUM UR-MCNC: 3.9 MG/DL — SIGNIFICANT CHANGE UP
MCHC RBC-ENTMCNC: 33.1 PG — HIGH (ref 27–31)
MCHC RBC-ENTMCNC: 34.2 G/DL — SIGNIFICANT CHANGE UP (ref 32–36)
MCV RBC AUTO: 96.7 FL — HIGH (ref 80–94)
MONOCYTES # BLD AUTO: 0.4 K/UL — SIGNIFICANT CHANGE UP (ref 0–0.8)
MONOCYTES NFR BLD AUTO: 5.4 % — SIGNIFICANT CHANGE UP (ref 3–10)
NEUTROPHILS # BLD AUTO: 3 K/UL — SIGNIFICANT CHANGE UP (ref 1.8–8)
NEUTROPHILS NFR BLD AUTO: 41.6 % — SIGNIFICANT CHANGE UP (ref 37–73)
PHOSPHATE 24H UR-MCNC: 40 MG/DL — SIGNIFICANT CHANGE UP
PHOSPHATE SERPL-MCNC: 2.5 MG/DL — SIGNIFICANT CHANGE UP (ref 2.4–4.7)
PLATELET # BLD AUTO: 83 K/UL — LOW (ref 150–400)
POTASSIUM SERPL-MCNC: 3.9 MMOL/L — SIGNIFICANT CHANGE UP (ref 3.5–5.3)
POTASSIUM SERPL-SCNC: 3.9 MMOL/L — SIGNIFICANT CHANGE UP (ref 3.5–5.3)
POTASSIUM UR-SCNC: 23 MMOL/L — SIGNIFICANT CHANGE UP
RBC # BLD: 4.23 M/UL — LOW (ref 4.6–6.2)
RBC # FLD: 13.7 % — SIGNIFICANT CHANGE UP (ref 11–15.6)
SODIUM SERPL-SCNC: 139 MMOL/L — SIGNIFICANT CHANGE UP (ref 135–145)
SODIUM UR-SCNC: 95 MMOL/L — SIGNIFICANT CHANGE UP
WBC # BLD: 7.2 K/UL — SIGNIFICANT CHANGE UP (ref 4.8–10.8)
WBC # FLD AUTO: 7.2 K/UL — SIGNIFICANT CHANGE UP (ref 4.8–10.8)

## 2017-05-08 PROCEDURE — 99232 SBSQ HOSP IP/OBS MODERATE 35: CPT

## 2017-05-08 PROCEDURE — 99233 SBSQ HOSP IP/OBS HIGH 50: CPT

## 2017-05-08 RX ORDER — TAMSULOSIN HYDROCHLORIDE 0.4 MG/1
0.4 CAPSULE ORAL AT BEDTIME
Qty: 0 | Refills: 0 | Status: DISCONTINUED | OUTPATIENT
Start: 2017-05-08 | End: 2017-05-09

## 2017-05-08 RX ADMIN — LINEZOLID 600 MILLIGRAM(S): 600 INJECTION, SOLUTION INTRAVENOUS at 06:27

## 2017-05-08 RX ADMIN — SODIUM CHLORIDE 3 MILLILITER(S): 9 INJECTION INTRAMUSCULAR; INTRAVENOUS; SUBCUTANEOUS at 21:10

## 2017-05-08 RX ADMIN — TAMSULOSIN HYDROCHLORIDE 0.4 MILLIGRAM(S): 0.4 CAPSULE ORAL at 21:13

## 2017-05-08 RX ADMIN — SODIUM CHLORIDE 3 MILLILITER(S): 9 INJECTION INTRAMUSCULAR; INTRAVENOUS; SUBCUTANEOUS at 12:12

## 2017-05-08 RX ADMIN — APIXABAN 5 MILLIGRAM(S): 2.5 TABLET, FILM COATED ORAL at 06:27

## 2017-05-08 RX ADMIN — Medication 1 TABLET(S): at 08:36

## 2017-05-08 RX ADMIN — Medication 50 MICROGRAM(S): at 06:27

## 2017-05-08 RX ADMIN — Medication 100 MILLIGRAM(S): at 17:57

## 2017-05-08 RX ADMIN — Medication 100 MILLIGRAM(S): at 08:36

## 2017-05-08 RX ADMIN — SODIUM CHLORIDE 3 MILLILITER(S): 9 INJECTION INTRAMUSCULAR; INTRAVENOUS; SUBCUTANEOUS at 06:27

## 2017-05-08 RX ADMIN — Medication 180 MILLIGRAM(S): at 06:26

## 2017-05-08 RX ADMIN — LINEZOLID 600 MILLIGRAM(S): 600 INJECTION, SOLUTION INTRAVENOUS at 17:57

## 2017-05-08 RX ADMIN — Medication 1 TABLET(S): at 17:57

## 2017-05-08 RX ADMIN — APIXABAN 5 MILLIGRAM(S): 2.5 TABLET, FILM COATED ORAL at 21:13

## 2017-05-08 RX ADMIN — Medication 80 MILLIGRAM(S): at 06:27

## 2017-05-08 RX ADMIN — ATENOLOL 50 MILLIGRAM(S): 25 TABLET ORAL at 06:26

## 2017-05-08 RX ADMIN — Medication 80 MILLIGRAM(S): at 17:58

## 2017-05-08 NOTE — PROGRESS NOTE ADULT - SUBJECTIVE AND OBJECTIVE BOX
[facility]  INFECTIOUS DISEASES AND INTERNAL MEDICINE OF [place]  =======================================================  Yash Bran MD  Diplomates American Board of Internal Medicine and Infectious Diseases  =======================================================    HOWARDALEKSANDAR JONES     Follow up Cellulitis    No fevers or chills      Allergies:  No Known Allergies      Antibiotics:  linezolid    Tablet 600milliGRAM(s) Oral every 12 hours       REVIEW OF SYSTEMS:  CONSTITUTIONAL: No fevers or chills  HEENT:  No diplopia or blurred vision. No earache, sore throat or runny nose.  CARDIOVASCULAR:  No pressure, squeezing, strangling, tightness, heaviness or aching about the chest, neck, axilla or epigastrium.  RESPIRATORY:  No cough, shortness of breath  GASTROINTESTINAL:  No nausea, vomiting or diarrhea.  GENITOURINARY:  No dysuria, frequency or urgency.   MUSCULOSKELETAL:  no joint aches, no muscle pain  SKIN:  LLE redness and swelling  NEUROLOGIC:  No  fasciculations or seizures  PSYCHIATRIC:  No disorder of thought or mood.  ENDOCRINE:  No heat or cold intolerance  HEMATOLOGICAL:  No easy bruising or bleeding.       Physical Exam:  Vital Signs Last 24 Hrs  T(C): 36.5, Max: 36.5 (05-07 @ 15:20)  T(F): 97.7, Max: 97.7 (05-07 @ 15:20)  HR: 60 (60 - 66)  BP: 156/74 (145/82 - 186/86)  RR: 18 (18 - 18)  SpO2: 96% (96% - 96%)    GEN: NAD, pleasant  HEENT: normocephalic and atraumatic. EOMI. PERRL.    NECK: Supple.   LUNGS: Clear to auscultation.  HEART: Regular rate and rhythm   CHEST: Rt chest wall Port and left chest wall PPM  ABDOMEN: Soft, nontender, and nondistended.  Positive bowel sounds.   : No CVA tenderness  MSK: No Joint swelling  EXTREMITIES: LLE with erythema, warmth, swelling  NEUROLOGIC: Cranial nerves II through XII are grossly intact.  PSYCHIATRIC: Appropriate affect .  SKIN: LLE with erythema, warmth, swelling        Labs:  05-08    139  |  103  |  23.0<H>  ----------------------------<  90  3.9   |  26.0  |  1.35<H>    Ca    8.3<L>      08 May 2017 07:06  Phos  2.5     05-08  Mg     2.3     05-08                            14.0   7.2   )-----------( 83       ( 08 May 2017 07:06 )             40.9             CARDIAC MARKERS ( 07 May 2017 06:52 )  x     / x     / 48 U/L / x     / x          RECENT CULTURES:  05-04 .Blood Blood XXXX XXXX   No growth at 48 hours    05-04 .Blood Blood XXXX XXXX   No growth at 48 hours [facility]  INFECTIOUS DISEASES AND INTERNAL MEDICINE OF [place]  =======================================================  Yash Bran MD  Diplomates American Board of Internal Medicine and Infectious Diseases  =======================================================    HOWARDALEKSANDAR JONES     Follow up Cellulitis    No fevers or chills      Allergies:  No Known Allergies      Antibiotics:  linezolid    Tablet 600milliGRAM(s) Oral every 12 hours       REVIEW OF SYSTEMS:  CONSTITUTIONAL: No fevers or chills  HEENT:  No diplopia or blurred vision. No earache, sore throat or runny nose.  CARDIOVASCULAR:  No pressure, squeezing, strangling, tightness, heaviness or aching about the chest, neck, axilla or epigastrium.  RESPIRATORY:  No cough, shortness of breath  GASTROINTESTINAL:  No nausea, vomiting or diarrhea.  GENITOURINARY:  No dysuria, frequency or urgency.   MUSCULOSKELETAL:  no joint aches, no muscle pain  SKIN:  LLE redness and swelling  NEUROLOGIC:  No  fasciculations or seizures  PSYCHIATRIC:  No disorder of thought or mood.  ENDOCRINE:  No heat or cold intolerance  HEMATOLOGICAL:  No easy bruising or bleeding.       Physical Exam:  Vital Signs Last 24 Hrs  T(C): 36.5, Max: 36.5 (05-07 @ 15:20)  T(F): 97.7, Max: 97.7 (05-07 @ 15:20)  HR: 60 (60 - 66)  BP: 156/74 (145/82 - 186/86)  RR: 18 (18 - 18)  SpO2: 96% (96% - 96%)    GEN: NAD, pleasant  HEENT: normocephalic and atraumatic. EOMI. PERRL.    NECK: Supple.   LUNGS: Clear to auscultation.  HEART: Regular rate and rhythm   CHEST: Rt chest wall Port and left chest wall PPM  ABDOMEN: Soft, nontender, and nondistended.  Positive bowel sounds.   : No CVA tenderness  MSK: No Joint swelling  EXTREMITIES: LLE with swelling, improved erythema  NEUROLOGIC: Cranial nerves II through XII are grossly intact.  PSYCHIATRIC: Appropriate affect .  SKIN: LLE with swelling, improved erythema        Labs:  05-08    139  |  103  |  23.0<H>  ----------------------------<  90  3.9   |  26.0  |  1.35<H>    Ca    8.3<L>      08 May 2017 07:06  Phos  2.5     05-08  Mg     2.3     05-08                            14.0   7.2   )-----------( 83       ( 08 May 2017 07:06 )             40.9             CARDIAC MARKERS ( 07 May 2017 06:52 )  x     / x     / 48 U/L / x     / x          RECENT CULTURES:  05-04 .Blood Blood XXXX XXXX   No growth at 48 hours    05-04 .Blood Blood XXXX XXXX   No growth at 48 hours

## 2017-05-08 NOTE — PROGRESS NOTE ADULT - PROBLEM SELECTOR PROBLEM 3
CHF (congestive heart failure)

## 2017-05-08 NOTE — CONSULT NOTE ADULT - ASSESSMENT
ARF: L leg cellulitis  Renal function already improving==> ? AIN or ATN  - cont gentle IVF  - avoid potential nephrotoxins  - check urine studies  - follow labs
77 y/o M with LLE cellulitis not improving on Oral Clindamycin
BPH

## 2017-05-08 NOTE — PROGRESS NOTE ADULT - PROBLEM SELECTOR PLAN 6
Levothyroxine 50mcg PO QD

## 2017-05-08 NOTE — PROGRESS NOTE ADULT - PROBLEM SELECTOR PROBLEM 2
Chronic gout, unspecified cause, unspecified site

## 2017-05-08 NOTE — CONSULT NOTE ADULT - SUBJECTIVE AND OBJECTIVE BOX
Patient is a 76y old  Male who presents with a chief complaint of left leg pain (05 May 2017 01:52)      HPI:  75 y/o male with history of Afib on A/C, HTN, HLD, Gout, Hypothyroidism, remote history of colon cancer s/p surgery/chemo 6 years ago. He presented to ER with L leg cellulitis which did not respond to outpatient therapy.  We are called for abnormal renal function; pt denies any history of CRF to his knowledge.      PAST MEDICAL & SURGICAL HISTORY:  Essential hypertension  Chronic gout, unspecified cause, unspecified site  Atrial fibrillation, unspecified type  No significant past surgical history      FAMILY HISTORY:  No pertinent family history in first degree relatives      Social History:  No tobacco, etoh, drugs    MEDICATIONS  (STANDING):  sodium chloride 0.9% lock flush 3milliLiter(s) IV Push every 8 hours  lactobacillus acidophilus 1Tablet(s) Oral two times a day with meals  apixaban 5milliGRAM(s) Oral two times a day  allopurinol 100milliGRAM(s) Oral two times a day after meals  sotalol 80milliGRAM(s) Oral two times a day  diltiazem   CD 180milliGRAM(s) Oral daily  levothyroxine 50MICROGram(s) Oral daily  ATENolol  Tablet 50milliGRAM(s) Oral daily  linezolid    Tablet 600milliGRAM(s) Oral every 12 hours  sodium chloride 0.45%. 1000milliLiter(s) IV Continuous <Continuous>    MEDICATIONS  (PRN):  acetaminophen   Tablet 650milliGRAM(s) Oral every 6 hours PRN For Temp greater than 38.5 C (101.3 F)  hydrALAZINE Injectable 10milliGRAM(s) IV Push every 6 hours PRN for SBP > 150      Allergies    No Known Allergies    Intolerances        REVIEW OF SYSTEMS:    CONSTITUTIONAL: No fever, weight loss, or fatigue  EYES: No eye pain, visual disturbances, or discharge  ENMT:  No difficulty hearing, tinnitus, vertigo; No sinus or throat pain  NECK: No pain or stiffness  RESPIRATORY: No cough, wheezing, chills or hemoptysis; No shortness of breath  CARDIOVASCULAR: No chest pain, palpitations, dizziness; L leg swelling  GASTROINTESTINAL: No abdominal or epigastric pain. No nausea, vomiting, or hematemesis; No diarrhea or constipation. No melena or hematochezia.  GENITOURINARY: No dysuria, frequency, hematuria, or incontinence  NEUROLOGICAL: No headaches, memory loss, loss of strength, numbness, or tremors  SKIN: No itching, burning, rashes, or lesions   LYMPH NODES: No enlarged glands  ENDOCRINE: No heat or cold intolerance; No hair loss  MUSCULOSKELETAL: No joint pain or swelling; No muscle, back, or extremity pain        Vital Signs Last 24 Hrs  T(C): 36.5, Max: 36.5 (05-07 @ 15:20)  T(F): 97.7, Max: 97.7 (05-07 @ 15:20)  HR: 60 (60 - 66)  BP: 156/74 (145/82 - 186/86)  BP(mean): --  RR: 18 (17 - 18)  SpO2: 96% (96% - 97%)    PHYSICAL EXAM:    GENERAL: NAD, well-groomed, well-developed  HEAD:  Atraumatic, Normocephalic  EYES: EOMI, PERRLA, conjunctiva and sclera clear  ENMT: No tonsillar erythema, exudates, or enlargement; Moist mucous membranes, Good dentition, No lesions  NECK: Supple, No JVD, Normal thyroid  NERVOUS SYSTEM:  Alert & Oriented X3, Good concentration; Motor Strength 5/5 B/L upper and lower extremities; DTRs 2+ intact and symmetric  CHEST/LUNG: Clear to percussion bilaterally; No rales, rhonchi, wheezing, or rubs  HEART: Regular rate and rhythm; No murmurs, rubs, or gallops  ABDOMEN: Soft, Nontender, Nondistended; Bowel sounds present  EXTREMITIES:  L leg edema; less erythema  LYMPH: No lymphadenopathy noted  SKIN: No rashes or lesions      LABS:                        14.0   7.2   )-----------( 83       ( 08 May 2017 07:06 )             40.9     05-08    139  |  103  |  23.0<H>  ----------------------------<  90  3.9   |  26.0  |  1.35<H>    Ca    8.3<L>      08 May 2017 07:06  Phos  2.5     05-08  Mg     2.3     05-08  Creatinine, Serum: 1.59 mg/dL (05.07.17 @ 06:52)            Magnesium, Serum: 2.3 mg/dL (05-08 @ 07:06)  Phosphorus Level, Serum: 2.5 mg/dL (05-08 @ 07:06)      RADIOLOGY & ADDITIONAL TESTS:     EXAM:  US KIDNEY(S)                          PROCEDURE DATE:  05/07/2017        INTERPRETATION:  Renal ultrasound examination dated 5/7/2017.    Clinical history: Acute kidney insufficiency of unknown duration.   Evaluate for obstruction.    Comparison is made to prior CT scan of the abdomen dated 9/12/2010.    Ultrasound examination demonstrated the right kidney to measure 11.7 cm   in length, and Celia the left kidney to measure 0.5 cm in length. No renal   calculus or gross hydronephrosis isappreciated bilaterally. A 1.2 cm   cyst is noted in the upper pole region of the right kidney. A 2.6 cm cyst   is noted in the midpole region of the right kidney. A 2.7 cm cyst is   noted in the lower pole region of the right kidney. These are without   significant change from prior CT scan examination. A 5.6 cm left   parapelvic renal cyst is noted unchanged from prior examination. A 3.7 cm   cyst is noted in the lower pole region of the left kidney without   significant change from prior study. No perinephric fluid collection is   seen bilaterally.    Limited examination of the abdominal aorta and inferior vena cava were   unremarkable.    Examination of bladder demonstrates no discrete focal bladder wall   thickening. No discrete bladder calculus is noted. Enlarged heterogeneous   prostate gland is noted which invaginates into the base of the bladder.    Impression: No renal calculus or gross hydronephrosis is appreciated   bilaterally. Bilateral renal cysts noted. Enlarged heterogeneous prostate   gland noted which invaginates into the bladder base.
[facility]  INFECTIOUS DISEASES AND INTERNAL MEDICINE OF [place]  =======================================================  Yash Bran MD  Diplomates American Board of Internal Medicine and Infectious Diseases  =======================================================      MRN-[id number]  ALEKSANDAR HOWARD       75y/o  Male comes in with 1 week of LLE cellulitis. Patient reports last week he went to [place] on a bus ride 3 hours each way. On Sunday (4/30/17) he noticed redness and swelling of his LLE. He reports a history of neuropathy but he did fell some pain at the site. Reported some chills earlier in the week but no recoded fevers and no recent chills.  He was given Clindamycin which he has been taking with no relief in symptoms. With no improvement he decided to come to the ER. In the ER he was Afebrile. No leukocytosis on labs. Duplex of LE with no DVT and Xray with no osteomyelitis. He was started on Vancomycin and reports much improvement in swelling.       Past Medical & Surgical Hx:  Essential hypertension  Chronic gout, unspecified cause, unspecified site  Atrial fibrillation, unspecified type  Colon Ca s/p surgery and chemo      Social Hx:  Former smoker. No ETOH or drug use      FAMILY HISTORY:  No pertinent family history in first degree relatives      Allergies  No Known Allergies      Antibiotics:  linezolid    Tablet 600milliGRAM(s) Oral every 12 hours       REVIEW OF SYSTEMS:  CONSTITUTIONAL: No fevers or chills  HEENT:  No diplopia or blurred vision. No earache, sore throat or runny nose.  CARDIOVASCULAR:  No pressure, squeezing, strangling, tightness, heaviness or aching about the chest, neck, axilla or epigastrium.  RESPIRATORY:  No cough, shortness of breath  GASTROINTESTINAL:  No nausea, vomiting or diarrhea.  GENITOURINARY:  No dysuria, frequency or urgency.   MUSCULOSKELETAL:  no joint aches, no muscle pain  SKIN:  LLE redness and swelling  NEUROLOGIC:  No  fasciculations or seizures  PSYCHIATRIC:  No disorder of thought or mood.  ENDOCRINE:  No heat or cold intolerance  HEMATOLOGICAL:  No easy bruising or bleeding.       Physical Exam:  Vital Signs Last 24 Hrs  T(C): 36.9, Max: 36.9 (05-05 @ 01:52)  T(F): 98.4, Max: 98.4 (05-05 @ 01:52)  HR: 61 (60 - 102)  BP: 151/72 (145/70 - 187/80)  BP(mean): 115 (115 - 115)  RR: 18 (16 - 20)  SpO2: 100% (99% - 100%)  Height (cm): 188 (05-04 @ 19:07)  Weight (kg): 113.4 (05-04 @ 19:07)  BMI (kg/m2): 32.1 (05-04 @ 19:07)  BSA (m2): 2.39 (05-04 @ 19:07)    GEN: NAD, pleasant  HEENT: normocephalic and atraumatic. EOMI. PERRL.    NECK: Supple.   LUNGS: Clear to auscultation.  HEART: Regular rate and rhythm   CHEST: Rt chest wall Port and left chest wall PPM  ABDOMEN: Soft, nontender, and nondistended.  Positive bowel sounds.   : No CVA tenderness  MSK: No Joint swelling  EXTREMITIES: LLE with erythema, warmth, swelling  NEUROLOGIC: Cranial nerves II through XII are grossly intact.  PSYCHIATRIC: Appropriate affect .  SKIN: LLE with erythema, warmth, swelling        Labs:  05-04    137  |  99  |  24.0<H>  ----------------------------<  97  5.2   |  27.0  |  1.32<H>    Ca    9.0      04 May 2017 21:01    TPro  7.5  /  Alb  3.9  /  TBili  1.2  /  DBili  x   /  AST  51<H>  /  ALT  46<H>  /  AlkPhos  121<H>  05-04                          15.0   9.4   )-----------( 110      ( 04 May 2017 21:01 )             43.7       PT/INR - ( 04 May 2017 21:01 )   PT: 13.0 sec;   INR: 1.18 ratio         PTT - ( 04 May 2017 21:01 )  PTT:35.5 sec    LIVER FUNCTIONS - ( 04 May 2017 21:01 )  Alb: 3.9 g/dL / Pro: 7.5 g/dL / ALK PHOS: 121 U/L / ALT: 46 U/L / AST: 51 U/L / GGT: x               EXAM:  US DPLX LWR EXT VEINS LTD LT                        PROCEDURE DATE:  05/04/2017    INTERPRETATION:    HISTORY: Left leg pain and redness  TECHNIQUE: Venous duplex ultrasonography was performed on the left lower   extremity.  COMPARISON: None  FINDINGS: The common femoral, femoral and popliteal veins demonstrate   normal flow and compression. The calf veins are within normal limits. No   fluid collections are noted.  IMPRESSION:   No evidence of left lower extremity deep vein thrombosis.         EXAM:  TIBIA FIBULA-LEFT                        PROCEDURE DATE:  05/04/2017    INTERPRETATION:  Radiographs of the left tibia and fibula       CLINICAL INFORMATION:  Swollen left leg, moderate, rule out ostial  Pain.  TECHNIQUE:  Frontal and lateral views of the tibia and fibula were   obtained.  FINDINGS:  No prior studies are available for review.  The tibia and fibula appear intact.   No fracture is seen.   No soft   tissue abnormalities are seen.  Moderate linear soft tissue calcification   is noted posterior to the proximal left tibia on the lateral view   measuring 7.8 cm in length, may represent cortical hyperostosis. Consider   outpatient noncontrast CT as clinically warranted. No evidence of acute   osteomyelitis.  IMPRESSION : No acute osseous abnormality.        EXAM:  CHEST P.A. LATERAL                        PROCEDURE DATE:  05/04/2017    INTERPRETATION:  TECHNIQUE: 2 views of the chest.  COMPARISON: 9/20/2010  CLINICAL HISTORY: Shortness of breath, leg pain   FINDINGS:  Frontal and lateral views of the chest demonstrates the lungs to be   clear. The cardiomediastinal silhouette is enlarged. No acute osseous   abnormalities. Right-sided MediPort catheter tip in the right atrium.   Left-sided dual-chamber pacemaker.  IMPRESSION:  No acute cardiopulmonary disease process.
HPI:  75 y/o male with history of Afib on A/C, HTN, HLD, Gout, Hypothyroidism, remote history of colon cancer s/p surgery/chemo 6 years ago. Patient saw PMD 6 days ago with left leg redness, warmth, pain and was told he has a cellulitis. He was given Cleocin which he has been taking but has not noticed any improvement. He denies any fever, chills, N/V, SOB, CP. He denies any known history of MRSA infection. In the ED patient with evidence of left leg cellulitis, not sepsis criteria. O/P xray and U/S showed no FB or DVT. (05 May 2017 01:52)    Patient admitted with left leg cellulitis.  BPH was appreciated on his abd sono.  He reports frequency of urination since being on IVF in the hospital.  No fevers, chills, n/v.  No hematuria.  Patient has occasional hesitancy of urination.        PAST MEDICAL & SURGICAL HISTORY:  Essential hypertension  Chronic gout, unspecified cause, unspecified site  Atrial fibrillation, unspecified type  No significant past surgical history      REVIEW OF SYSTEMS:    Reviewed H and P ROS     MEDICATIONS  (STANDING):  sodium chloride 0.9% lock flush 3milliLiter(s) IV Push every 8 hours  lactobacillus acidophilus 1Tablet(s) Oral two times a day with meals  apixaban 5milliGRAM(s) Oral two times a day  allopurinol 100milliGRAM(s) Oral two times a day after meals  sotalol 80milliGRAM(s) Oral two times a day  diltiazem   CD 180milliGRAM(s) Oral daily  levothyroxine 50MICROGram(s) Oral daily  ATENolol  Tablet 50milliGRAM(s) Oral daily  linezolid    Tablet 600milliGRAM(s) Oral every 12 hours  sodium chloride 0.45%. 1000milliLiter(s) IV Continuous <Continuous>  tamsulosin 0.4milliGRAM(s) Oral at bedtime    MEDICATIONS  (PRN):  acetaminophen   Tablet 650milliGRAM(s) Oral every 6 hours PRN For Temp greater than 38.5 C (101.3 F)  hydrALAZINE Injectable 10milliGRAM(s) IV Push every 6 hours PRN for SBP > 150      Allergies    No Known Allergies    Intolerances        SOCIAL HISTORY:    FAMILY HISTORY:  No pertinent family history in first degree relatives      Vital Signs Last 24 Hrs  T(C): 36.2, Max: 36.5 (05-08 @ 08:04)  T(F): 97.2, Max: 97.7 (05-08 @ 08:04)  HR: 59 (59 - 62)  BP: 147/75 (145/82 - 156/74)  BP(mean): --  RR: 18 (18 - 18)  SpO2: 97% (96% - 97%)    PHYSICAL EXAM:  abd- soft, nt, nd, bs+, no masses    Penis and testicles palpably normal, defer prostate exam as an inpatient.        LABS:                        14.0   7.2   )-----------( 83       ( 08 May 2017 07:06 )             40.9     05-08    139  |  103  |  23.0<H>  ----------------------------<  90  3.9   |  26.0  |  1.35<H>    Ca    8.3<L>      08 May 2017 07:06  Phos  2.5     05-08  Mg     2.3     05-08            RADIOLOGY & ADDITIONAL STUDIES:

## 2017-05-08 NOTE — PROGRESS NOTE ADULT - PROBLEM SELECTOR PLAN 5
DASH/TLC diet  Atenolol 50mg PO QD  Diltiazem CD 180mg PO QD  Losartan 50mg PO QD  Sotalol 80mg PO BID

## 2017-05-08 NOTE — PROGRESS NOTE ADULT - SUBJECTIVE AND OBJECTIVE BOX
CHIEF COMPLAINT/INTERVAL HISTORY:    Patient is a 76y old  Male who presents with a chief complaint of left leg pain (05 May 2017 01:52)      HPI:  75 y/o male with history of Afib on A/C, HTN, HLD, Gout, Hypothyroidism, remote history of colon cancer s/p surgery/chemo 6 years ago. Patient saw PMD 6 days ago with left leg redness, warmth, pain and was told he has a cellulitis. He was given Cleocin which he has been taking but has not noticed any improvement. He denies any fever, chills, N/V, SOB, CP. He denies any known history of MRSA infection. In the ED patient with evidence of left leg cellulitis, not sepsis criteria. O/P xray and U/S showed no FB or DVT. (05 May 2017 01:52)      SUBJECTIVE & OBJECTIVE: Pt seen and examined at bedside. Denies any SOB, CP, palp. Bladder scan showed 40 cc urine post void. Feels that erythema is getting better.     ICU Vital Signs Last 24 Hrs  T(C): 36.5, Max: 36.5 (05-07 @ 15:20)  T(F): 97.7, Max: 97.7 (05-07 @ 15:20)  HR: 60 (60 - 66)  BP: 156/74 (145/82 - 186/86)  BP(mean): --  ABP: --  ABP(mean): --  RR: 18 (18 - 18)  SpO2: 96% (96% - 96%)        MEDICATIONS  (STANDING):  sodium chloride 0.9% lock flush 3milliLiter(s) IV Push every 8 hours  lactobacillus acidophilus 1Tablet(s) Oral two times a day with meals  apixaban 5milliGRAM(s) Oral two times a day  allopurinol 100milliGRAM(s) Oral two times a day after meals  sotalol 80milliGRAM(s) Oral two times a day  diltiazem   CD 180milliGRAM(s) Oral daily  levothyroxine 50MICROGram(s) Oral daily  ATENolol  Tablet 50milliGRAM(s) Oral daily  linezolid    Tablet 600milliGRAM(s) Oral every 12 hours  sodium chloride 0.45%. 1000milliLiter(s) IV Continuous <Continuous>    MEDICATIONS  (PRN):  acetaminophen   Tablet 650milliGRAM(s) Oral every 6 hours PRN For Temp greater than 38.5 C (101.3 F)  hydrALAZINE Injectable 10milliGRAM(s) IV Push every 6 hours PRN for SBP > 150      LABS:                        14.0   7.2   )-----------( 83       ( 08 May 2017 07:06 )             40.9     05-08    139  |  103  |  23.0<H>  ----------------------------<  90  3.9   |  26.0  |  1.35<H>    Ca    8.3<L>      08 May 2017 07:06  Phos  2.5     05-08  Mg     2.3     05-08            CAPILLARY BLOOD GLUCOSE      RECENT CULTURES:      RADIOLOGY & ADDITIONAL TESTS:      PHYSICAL EXAM:    GENERAL: NAD, well-groomed, well-developed  HEAD:  Atraumatic, Normocephalic  EYES: EOMI, PERRLA, conjunctiva and sclera clear  ENMT: Moist mucous membranes  NECK: Supple, No JVD  NERVOUS SYSTEM:  Alert & Oriented X3, Motor Strength 5/5 B/L upper and lower extremities; DTRs 2+ intact and symmetric  CHEST/LUNG: Clear to auscultation bilaterally; No rales, rhonchi, wheezing, or rubs,  Rt chest wall Port and left chest wall PPM  HEART: Regular rate and rhythm; No murmurs, rubs, or gallops  ABDOMEN: Soft, Nontender, Nondistended; Bowel sounds present  EXTREMITIES:  2+ Peripheral Pulses,  LLE with  erythema/ warmth, swelling remains

## 2017-05-08 NOTE — PROGRESS NOTE ADULT - PROBLEM SELECTOR PLAN 4
Eliquis 5mg PO BID  Atenolol 50mg PO QD  Diltiazem CD 180mg PO QD  Losartan 50mg PO QD  Sotalol 80mg PO BID

## 2017-05-08 NOTE — PROGRESS NOTE ADULT - PROBLEM SELECTOR PROBLEM 4
Atrial fibrillation, unspecified type

## 2017-05-08 NOTE — PROGRESS NOTE ADULT - PROBLEM SELECTOR PLAN 1
Failed outpt PO Clindamycin tx  Linezolid 600mg PO [id number] (4/7 days)   Pt. instructed to avoid cheese, and wine while on abx  Acidophillus PO BID   ID Consult appreciated  Keep toes dry and clean  Ambulation  OOB  Duplex LLE-- No evidence of left lower extremity deep vein thrombosis  Tylenol PRN-- pain  Blood c&s -- NGTD

## 2017-05-08 NOTE — PROGRESS NOTE ADULT - PROBLEM SELECTOR PLAN 1
Blood cultures no growth 5/4/17  No fever or leukocytosis  Erythema improving, swelling still present   Continue Linezolid 600mg PO [id number]  Will complete 7 days of antibiotics  Patient advised to avoid Wine and cheese on Linezolid

## 2017-05-08 NOTE — PROGRESS NOTE ADULT - ATTENDING COMMENTS
PJ with no hx of CKD per pt- IVF, PJ worsening, will call nephrology, enlarged prostate invaginating bladder on renal US, stat bladder scan, pt urinating as per him,  called
PJ with no hx of CKD per pt- IVF, f/u Cr, d/c motrin
PJ with no hx of CKD per pt- gentle IVF, PJ improving, nephrology appreciated, enlarged prostate invaginating bladder on renal US but no hydro, f/u with  as outpatient (pt asymptomatic), bladder scan with 30 cc post-void residual
Please call PRN. Will sign off.
Pt is seen and examined, discussed with Halina. This is 76Y M admitted for left leg cellulitis, failed outpatient therapy, ID consult requested, Abx as per ID, leg elevated, pain control, venous doppler negative for DVT. Cont. rest of home medication, f/u blood c.s

## 2017-05-08 NOTE — PROGRESS NOTE ADULT - PROBLEM SELECTOR PROBLEM 6
Hypothyroidism, unspecified type

## 2017-05-08 NOTE — CONSULT NOTE ADULT - PROBLEM SELECTOR RECOMMENDATION 9
flomax    do not check psa    Will sign off    Please FU with me as an outpatient    Minimally increased creatanine is not secondary to his bph.

## 2017-05-09 VITALS
OXYGEN SATURATION: 98 % | TEMPERATURE: 97 F | HEART RATE: 59 BPM | SYSTOLIC BLOOD PRESSURE: 151 MMHG | DIASTOLIC BLOOD PRESSURE: 72 MMHG

## 2017-05-09 LAB
ANION GAP SERPL CALC-SCNC: 10 MMOL/L — SIGNIFICANT CHANGE UP (ref 5–17)
BASOPHILS # BLD AUTO: 0 K/UL — SIGNIFICANT CHANGE UP (ref 0–0.2)
BASOPHILS NFR BLD AUTO: 0.3 % — SIGNIFICANT CHANGE UP (ref 0–2)
BUN SERPL-MCNC: 24 MG/DL — HIGH (ref 8–20)
CALCIUM SERPL-MCNC: 8.4 MG/DL — LOW (ref 8.6–10.2)
CHLORIDE SERPL-SCNC: 104 MMOL/L — SIGNIFICANT CHANGE UP (ref 98–107)
CO2 SERPL-SCNC: 25 MMOL/L — SIGNIFICANT CHANGE UP (ref 22–29)
CREAT SERPL-MCNC: 1.37 MG/DL — HIGH (ref 0.5–1.3)
EOSINOPHIL # BLD AUTO: 0.2 K/UL — SIGNIFICANT CHANGE UP (ref 0–0.5)
EOSINOPHIL NFR BLD AUTO: 2.5 % — SIGNIFICANT CHANGE UP (ref 0–5)
GLUCOSE SERPL-MCNC: 102 MG/DL — SIGNIFICANT CHANGE UP (ref 70–115)
HCT VFR BLD CALC: 39.6 % — LOW (ref 42–52)
HGB BLD-MCNC: 13.8 G/DL — LOW (ref 14–18)
LYMPHOCYTES # BLD AUTO: 3.7 K/UL — SIGNIFICANT CHANGE UP (ref 1–4.8)
LYMPHOCYTES # BLD AUTO: 50.1 % — SIGNIFICANT CHANGE UP (ref 20–55)
MAGNESIUM SERPL-MCNC: 2.1 MG/DL — SIGNIFICANT CHANGE UP (ref 1.8–2.5)
MCHC RBC-ENTMCNC: 33.3 PG — HIGH (ref 27–31)
MCHC RBC-ENTMCNC: 34.8 G/DL — SIGNIFICANT CHANGE UP (ref 32–36)
MCV RBC AUTO: 95.7 FL — HIGH (ref 80–94)
MONOCYTES # BLD AUTO: 0.3 K/UL — SIGNIFICANT CHANGE UP (ref 0–0.8)
MONOCYTES NFR BLD AUTO: 4.6 % — SIGNIFICANT CHANGE UP (ref 3–10)
NEUTROPHILS # BLD AUTO: 3.1 K/UL — SIGNIFICANT CHANGE UP (ref 1.8–8)
NEUTROPHILS NFR BLD AUTO: 42.4 % — SIGNIFICANT CHANGE UP (ref 37–73)
PHOSPHATE SERPL-MCNC: 2.3 MG/DL — LOW (ref 2.4–4.7)
PLATELET # BLD AUTO: 87 K/UL — LOW (ref 150–400)
POTASSIUM SERPL-MCNC: 4 MMOL/L — SIGNIFICANT CHANGE UP (ref 3.5–5.3)
POTASSIUM SERPL-SCNC: 4 MMOL/L — SIGNIFICANT CHANGE UP (ref 3.5–5.3)
RBC # BLD: 4.14 M/UL — LOW (ref 4.6–6.2)
RBC # FLD: 13.7 % — SIGNIFICANT CHANGE UP (ref 11–15.6)
SODIUM SERPL-SCNC: 139 MMOL/L — SIGNIFICANT CHANGE UP (ref 135–145)
WBC # BLD: 7.3 K/UL — SIGNIFICANT CHANGE UP (ref 4.8–10.8)
WBC # FLD AUTO: 7.3 K/UL — SIGNIFICANT CHANGE UP (ref 4.8–10.8)

## 2017-05-09 PROCEDURE — 99239 HOSP IP/OBS DSCHRG MGMT >30: CPT

## 2017-05-09 PROCEDURE — 76881 US COMPL JOINT R-T W/IMG: CPT | Mod: 26,LT

## 2017-05-09 RX ORDER — LOSARTAN POTASSIUM 100 MG/1
1 TABLET, FILM COATED ORAL
Qty: 0 | Refills: 0 | COMMUNITY

## 2017-05-09 RX ORDER — SACCHAROMYCES BOULARDII 250 MG
1 POWDER IN PACKET (EA) ORAL
Qty: 14 | Refills: 0 | OUTPATIENT
Start: 2017-05-09 | End: 2017-05-16

## 2017-05-09 RX ORDER — LINEZOLID 600 MG/300ML
1 INJECTION, SOLUTION INTRAVENOUS
Qty: 6 | Refills: 0 | OUTPATIENT
Start: 2017-05-09 | End: 2017-05-12

## 2017-05-09 RX ORDER — TAMSULOSIN HYDROCHLORIDE 0.4 MG/1
1 CAPSULE ORAL
Qty: 30 | Refills: 0
Start: 2017-05-09 | End: 2017-06-08

## 2017-05-09 RX ORDER — FEBUXOSTAT 40 MG/1
1 TABLET ORAL
Qty: 0 | Refills: 0 | COMMUNITY

## 2017-05-09 RX ADMIN — SODIUM CHLORIDE 50 MILLILITER(S): 9 INJECTION, SOLUTION INTRAVENOUS at 02:28

## 2017-05-09 RX ADMIN — Medication 1 TABLET(S): at 08:10

## 2017-05-09 RX ADMIN — Medication 50 MICROGRAM(S): at 05:47

## 2017-05-09 RX ADMIN — Medication 80 MILLIGRAM(S): at 05:47

## 2017-05-09 RX ADMIN — ATENOLOL 50 MILLIGRAM(S): 25 TABLET ORAL at 05:47

## 2017-05-09 RX ADMIN — LINEZOLID 600 MILLIGRAM(S): 600 INJECTION, SOLUTION INTRAVENOUS at 05:47

## 2017-05-09 RX ADMIN — SODIUM CHLORIDE 3 MILLILITER(S): 9 INJECTION INTRAMUSCULAR; INTRAVENOUS; SUBCUTANEOUS at 16:57

## 2017-05-09 RX ADMIN — Medication 100 MILLIGRAM(S): at 08:10

## 2017-05-09 RX ADMIN — Medication 180 MILLIGRAM(S): at 05:47

## 2017-05-09 RX ADMIN — SODIUM CHLORIDE 3 MILLILITER(S): 9 INJECTION INTRAMUSCULAR; INTRAVENOUS; SUBCUTANEOUS at 05:46

## 2017-05-09 RX ADMIN — APIXABAN 5 MILLIGRAM(S): 2.5 TABLET, FILM COATED ORAL at 05:47

## 2017-05-09 NOTE — DISCHARGE NOTE ADULT - CARE PLAN
Principal Discharge DX:	Cellulitis of left lower extremity  Goal:	Improving  Instructions for follow-up, activity and diet:	Continue current medications as prescribed.  Follow up with primary doctor.  Secondary Diagnosis:	Atrial fibrillation, unspecified type  Instructions for follow-up, activity and diet:	Continue current medications as prescribed.  Follow up with primary doctor.  Secondary Diagnosis:	CHF (congestive heart failure)  Instructions for follow-up, activity and diet:	Continue current medications as prescribed.  Follow up with primary doctor.  Secondary Diagnosis:	Chronic gout, unspecified cause, unspecified site  Instructions for follow-up, activity and diet:	Continue current medications as prescribed.  Follow up with primary doctor.  Secondary Diagnosis:	PJ (acute kidney injury)  Instructions for follow-up, activity and diet:	Improved.  Follow up with primary doctor.  Secondary Diagnosis:	Essential hypertension  Instructions for follow-up, activity and diet:	Continue current medications as prescribed.  Follow up with primary doctor.  Secondary Diagnosis:	Benign prostatic hyperplasia, presence of lower urinary tract symptoms unspecified, unspecified morphology  Instructions for follow-up, activity and diet:	Continue current medications as prescribed.  Follow up with primary doctor. Principal Discharge DX:	Cellulitis of left lower extremity  Goal:	Improving  Instructions for follow-up, activity and diet:	Continue current medications as prescribed.  Follow up with primary doctor.  Secondary Diagnosis:	Atrial fibrillation, unspecified type  Instructions for follow-up, activity and diet:	Continue current medications as prescribed. Maintain leg elevation & cool compress to affected extrimity  Follow up with primary doctor.  Secondary Diagnosis:	CHF (congestive heart failure)  Instructions for follow-up, activity and diet:	Continue current medications as prescribed.  Follow up with primary doctor.  Secondary Diagnosis:	Chronic gout, unspecified cause, unspecified site  Instructions for follow-up, activity and diet:	Continue current medications as prescribed.  Follow up with primary doctor.  Secondary Diagnosis:	PJ (acute kidney injury)  Instructions for follow-up, activity and diet:	Improved. Losartan on hold due to PJ  Follow up with primary doctor & nephrology for BMP check. Avoid nephrotoxins  Secondary Diagnosis:	Essential hypertension  Instructions for follow-up, activity and diet:	Continue current medications as prescribed. Losartan on hold due to PJ. Stop using until approved by PMD.   Follow up with primary doctor.  Secondary Diagnosis:	Benign prostatic hyperplasia, presence of lower urinary tract symptoms unspecified, unspecified morphology  Instructions for follow-up, activity and diet:	Continue current medications as prescribed.  Follow up with primary doctor & urologist

## 2017-05-09 NOTE — DISCHARGE NOTE ADULT - MEDICATION SUMMARY - MEDICATIONS TO STOP TAKING
I will STOP taking the medications listed below when I get home from the hospital:    Uloric 80 mg oral tablet  -- 1 tab(s) by mouth once a day    losartan 50 mg oral tablet  -- 1 tab(s) by mouth once a day

## 2017-05-09 NOTE — DISCHARGE NOTE ADULT - HOSPITAL COURSE
75 y/o male with history of Afib on A/C, HTN, HLD, Gout, Hypothyroidism, remote history of colon cancer s/p surgery/chemo 6 years ago. Patient saw PMD 6 days ago with left leg redness, warmth, pain and was told he has a cellulitis. He was given Cleocin which he has been taking but has not noticed any improvement. He denies any fever, chills, N/V, SOB, CP. He denies any known history of MRSA infection. In the ED patient with evidence of left leg cellulitis, not sepsis criteria. O/P xray and U/S showed no FB or DVT. Pt with significant improvement in erythema, swelling persists. Extremity US shows no fluid collection. Leg elevation advised. Pt to f/u with PMD &  as outpatient. VSS. ARB held due to PJ. IVF given. PJ improving. Renal US wnl. Renal following. No further recs. Avoid nephrotoxins    Assessment and Plan:   Problem/Plan - 1:  ·  Problem: Cellulitis.  Plan: Failed outpt PO Clindamycin tx  Linezolid 600mg PO [id number] (5/7 days)   Pt. instructed to avoid cheese, and wine while on abx  Acidophillus PO BID   ID Consult appreciated  Keep toes dry and clean  Ambulation  OOB  Duplex LLE-- No evidence of left lower extremity deep vein thrombosis  Tylenol PRN-- pain  Blood c&s -- NGTD.     Problem/Plan - 2:  ·  Problem: Chronic gout, unspecified cause, unspecified site.  Plan: Allopurinol 100mg PO BID.     Problem/Plan - 3:  ·  Problem: CHF (congestive heart failure).  Plan: Appears euvolemic currently  Atenolol 50mg PO QD  Hold Losartan 50mg PO QD due to PJ.     Problem/Plan - 4:  ·  Problem: Atrial fibrillation, unspecified type.  Plan: Eliquis 5mg PO BID  Atenolol 50mg PO QD  Diltiazem CD 180mg PO QD  Sotalol 80mg PO BID.     Problem/Plan - 5:  ·  Problem: Essential hypertension.  Plan: DASH/TLC diet  Atenolol 50mg PO QD  Diltiazem CD 180mg PO QD  Losartan 50mg PO QD  Sotalol 80mg PO BID.     Problem/Plan - 6:  Problem: Hypothyroidism, unspecified type. Plan: Levothyroxine 50mcg PO QD.    Problem/Plan - 7:  ·  Problem: Preventive measure.  Plan: DVT prx-- Eliquis 5mg PO BID  Keep toes dry and clean  Ambulation  OOB  Tylenol/Motrin PRN for pain.   PJ with no hx of CKD per pt- gentle IVF, PJ improving, Cr 1.3, nephrology appreciated, enlarged prostate invaginating bladder on renal US but no hydro, f/u with  as outpatient (pt asymptomatic), flomax per Dr. Wolff, bladder scan with 30 cc post-void residual.     GENERAL: NAD, well-groomed, well-developed  HEAD:  Atraumatic, Normocephalic  EYES: EOMI, PERRLA, conjunctiva and sclera clear  ENMT: Moist mucous membranes  NECK: Supple, No JVD  NERVOUS SYSTEM:  Alert & Oriented X3, Motor Strength 5/5 B/L upper and lower extremities; DTRs 2+ intact and symmetric  CHEST/LUNG: Clear to auscultation bilaterally; No rales, rhonchi, wheezing, or rubs,  Rt chest wall Port and left chest wall PPM  HEART: Regular rate and rhythm; No murmurs, rubs, or gallops  ABDOMEN: Soft, Nontender, Nondistended; Bowel sounds present  EXTREMITIES:  2+ Peripheral Pulses,  LLE with  erythema/ warmth, swelling remains

## 2017-05-09 NOTE — DISCHARGE NOTE ADULT - CARE PROVIDER_API CALL
SUSHIL Marcelino  Phone: (   )    -  Fax: (   )    - Mikel Wolff), Urology  [address] [place] [address]  Phone: [phone]  Fax: [phone]    Alejandro Bran), Infectious Disease; Internal Medicine  [address] [place] [address]  Phone: [phone]  Fax: [phone]    SUSHIL Marcelino  Phone: (   )    -  Fax: (   )    -    Silck Haines), Internal Medicine; Nephrology  [address] [place] [address]  Phone: [phone]  Fax: [phone]

## 2017-05-09 NOTE — DISCHARGE NOTE ADULT - PATIENT PORTAL LINK FT
“You can access the Follow[employer] Patient Portal, offered by [facility], by registering with the following website: [email or web address]”

## 2017-05-09 NOTE — DISCHARGE NOTE ADULT - SECONDARY DIAGNOSIS.
Atrial fibrillation, unspecified type CHF (congestive heart failure) Chronic gout, unspecified cause, unspecified site PJ (acute kidney injury) Essential hypertension Benign prostatic hyperplasia, presence of lower urinary tract symptoms unspecified, unspecified morphology

## 2017-05-09 NOTE — DISCHARGE NOTE ADULT - PROVIDER TOKENS
FREE:[LAST:[Marcelino],FIRST:[PMD],PHONE:[(   )    -],FAX:[(   )    -]] TOKEN:'[id number]',TOKEN:'[id number]',FREE:[LAST:[Marcelino],FIRST:[PMD],PHONE:[(   )    -],FAX:[(   )    -]],TOKEN:'[id number]'

## 2017-05-09 NOTE — PROGRESS NOTE ADULT - ASSESSMENT
ARF: L leg cellulitis   ? AIN or ATN==> improved  Baseline Screat is not known  - cont gentle IVF  - avoid potential nephrotoxins  - follow labs

## 2017-05-09 NOTE — DISCHARGE NOTE ADULT - CARE PROVIDERS DIRECT ADDRESSES
,DirectAddress_Unknown ,DirectAddress_Unknown,DirectAddress_Unknown,DirectAddress_Unknown,DirectAddress_Unknown

## 2017-05-09 NOTE — DISCHARGE NOTE ADULT - MEDICATION SUMMARY - MEDICATIONS TO TAKE
I will START or STAY ON the medications listed below when I get home from the hospital:    tamsulosin 0.4 mg oral capsule  -- 1 cap(s) by mouth once a day (at bedtime)  -- Indication: For Bph    tamsulosin 0.4 mg oral capsule  -- 1 cap(s) by mouth once a day (at bedtime)  -- Indication: For Bph    sotalol 80 mg oral tablet  -- 1 tab(s) by mouth 2 times a day  -- Indication: For AF    dilTIAZem 180 mg/24 hours oral capsule, extended release  -- 1 cap(s) by mouth once a day  -- Indication: For AF    Eliquis 5 mg oral tablet  -- 1 tab(s) by mouth 2 times a day  -- Indication: For AF    allopurinol 300 mg oral tablet  -- 1 tab(s) by mouth once a day  -- Indication: For gout    atenolol 50 mg oral tablet  -- 1 tab(s) by mouth once a day  -- Indication: For Htn    linezolid 600 mg oral tablet  -- 1 tab(s) by mouth every 12 hours  -- Indication: For Cellulitis    Florastor 250 mg oral capsule  -- 1 cap(s) by mouth 2 times a day  -- Indication: For Probiotics    Thyroxine Sodium Pentahydrate  -- 50 milligram(s)    -- Indication: For Hypothyroidism, unspecified type

## 2017-05-09 NOTE — DISCHARGE NOTE ADULT - PLAN OF CARE
Improving Continue current medications as prescribed.  Follow up with primary doctor. Improved.  Follow up with primary doctor. Continue current medications as prescribed. Maintain leg elevation & cool compress to affected extrimity  Follow up with primary doctor. Continue current medications as prescribed. Losartan on hold due to PJ. Stop using until approved by PMD.   Follow up with primary doctor. Continue current medications as prescribed.  Follow up with primary doctor & urologist Improved. Losartan on hold due to PJ  Follow up with primary doctor & nephrology for BMP check. Avoid nephrotoxins

## 2017-05-09 NOTE — PROGRESS NOTE ADULT - SUBJECTIVE AND OBJECTIVE BOX
NEPHROLOGY INTERVAL HPI/OVERNIGHT EVENTS:  pt clinically stable  no acute distress    MEDICATIONS  (STANDING):  sodium chloride 0.9% lock flush 3milliLiter(s) IV Push every 8 hours  lactobacillus acidophilus 1Tablet(s) Oral two times a day with meals  apixaban 5milliGRAM(s) Oral two times a day  allopurinol 100milliGRAM(s) Oral two times a day after meals  sotalol 80milliGRAM(s) Oral two times a day  diltiazem   CD 180milliGRAM(s) Oral daily  levothyroxine 50MICROGram(s) Oral daily  ATENolol  Tablet 50milliGRAM(s) Oral daily  linezolid    Tablet 600milliGRAM(s) Oral every 12 hours  sodium chloride 0.45%. 1000milliLiter(s) IV Continuous <Continuous>  tamsulosin 0.4milliGRAM(s) Oral at bedtime    MEDICATIONS  (PRN):  acetaminophen   Tablet 650milliGRAM(s) Oral every 6 hours PRN For Temp greater than 38.5 C (101.3 F)  hydrALAZINE Injectable 10milliGRAM(s) IV Push every 6 hours PRN for SBP > 150      Allergies    No Known Allergies    Intolerances        Vital Signs Last 24 Hrs  T(C): 36.1, Max: 36.2 (05-08 @ 15:45)  T(F): 97, Max: 97.2 (05-08 @ 15:45)  HR: 61 (59 - 61)  BP: 154/73 (146/76 - 154/73)  BP(mean): --  RR: 18 (18 - 18)  SpO2: 99% (97% - 99%)    PHYSICAL EXAM:  GENERAL: NAD, well-groomed, well-developed  HEAD:  Atraumatic, Normocephalic  EYES: EOMI, PERRLA, conjunctiva and sclera clear  ENMT: No tonsillar erythema, exudates, or enlargement; Moist mucous membranes, Good dentition, No lesions  NECK: Supple, No JVD, Normal thyroid  NERVOUS SYSTEM:  Alert & Oriented X3, intact and symmetric  CHEST/LUNG: Clear to percussion bilaterally; No rales, rhonchi, wheezing, or rubs  HEART: Regular rate and rhythm; No murmurs, rubs, or gallops  ABDOMEN: Soft, Nontender, Nondistended; Bowel sounds present  EXTREMITIES:  L leg edema; less erythema  LYMPH: No lymphadenopathy noted  SKIN: No rashes or lesions    LABS:                        13.8   7.3   )-----------( x        ( 09 May 2017 06:41 )             39.6     05-09    139  |  104  |  24.0<H>  ----------------------------<  102  4.0   |  25.0  |  1.37<H>    Ca    8.4<L>      09 May 2017 06:41  Phos  2.3     05-09  Mg     2.1     05-09    Creatinine, Serum: 1.35 mg/dL (05.08.17 @ 07:06)            Magnesium, Serum: 2.1 mg/dL (05-09 @ 06:41)  Phosphorus Level, Serum: 2.3 mg/dL (05-09 @ 06:41)      RADIOLOGY & ADDITIONAL TESTS:   EXAM:  US KIDNEY(S)                          PROCEDURE DATE:  05/07/2017        INTERPRETATION:  Renal ultrasound examination dated 5/7/2017.    Clinical history: Acute kidney insufficiency of unknown duration.   Evaluate for obstruction.    Comparison is made to prior CT scan of the abdomen dated 9/12/2010.    Ultrasound examination demonstrated the right kidney to measure 11.7 cm   in length, and Celia the left kidney to measure 0.5 cm in length. No renal   calculus or gross hydronephrosis isappreciated bilaterally. A 1.2 cm   cyst is noted in the upper pole region of the right kidney. A 2.6 cm cyst   is noted in the midpole region of the right kidney. A 2.7 cm cyst is   noted in the lower pole region of the right kidney. These are without   significant change from prior CT scan examination. A 5.6 cm left   parapelvic renal cyst is noted unchanged from prior examination. A 3.7 cm   cyst is noted in the lower pole region of the left kidney without   significant change from prior study. No perinephric fluid collection is   seen bilaterally.    Limited examination of the abdominal aorta and inferior vena cava were   unremarkable.    Examination of bladder demonstrates no discrete focal bladder wall   thickening. No discrete bladder calculus is noted. Enlarged heterogeneous   prostate gland is noted which invaginates into the base of the bladder.    Impression: No renal calculus or gross hydronephrosis is appreciated   bilaterally. Bilateral renal cysts noted. Enlarged heterogeneous prostate   gland noted which invaginates into the bladder base.                NEEL SAMANIEGO M.D., ATTENDING RADIOLOGIST  This document has been electronically signed. May  7 2017 11:59AM

## 2017-05-10 LAB
CULTURE RESULTS: SIGNIFICANT CHANGE UP
CULTURE RESULTS: SIGNIFICANT CHANGE UP
SPECIMEN SOURCE: SIGNIFICANT CHANGE UP
SPECIMEN SOURCE: SIGNIFICANT CHANGE UP

## 2017-05-16 ENCOUNTER — EMERGENCY (EMERGENCY)
Facility: HOSPITAL | Age: 77
LOS: 1 days | Discharge: HOME | End: 2017-05-16
Attending: EMERGENCY MEDICINE
Payer: MEDICARE

## 2017-05-16 VITALS
HEART RATE: 60 BPM | OXYGEN SATURATION: 99 % | RESPIRATION RATE: 22 BRPM | WEIGHT: 250 LBS | TEMPERATURE: 98 F | SYSTOLIC BLOOD PRESSURE: 135 MMHG | DIASTOLIC BLOOD PRESSURE: 68 MMHG

## 2017-05-16 VITALS
TEMPERATURE: 98 F | HEART RATE: 60 BPM | SYSTOLIC BLOOD PRESSURE: 156 MMHG | DIASTOLIC BLOOD PRESSURE: 70 MMHG | OXYGEN SATURATION: 100 % | RESPIRATION RATE: 20 BRPM

## 2017-05-16 DIAGNOSIS — Z95.0 PRESENCE OF CARDIAC PACEMAKER: ICD-10-CM

## 2017-05-16 DIAGNOSIS — I10 ESSENTIAL (PRIMARY) HYPERTENSION: ICD-10-CM

## 2017-05-16 DIAGNOSIS — I48.91 UNSPECIFIED ATRIAL FIBRILLATION: ICD-10-CM

## 2017-05-16 DIAGNOSIS — R06.02 SHORTNESS OF BREATH: ICD-10-CM

## 2017-05-16 DIAGNOSIS — Z87.891 PERSONAL HISTORY OF NICOTINE DEPENDENCE: ICD-10-CM

## 2017-05-16 DIAGNOSIS — Z79.899 OTHER LONG TERM (CURRENT) DRUG THERAPY: ICD-10-CM

## 2017-05-16 DIAGNOSIS — M79.89 OTHER SPECIFIED SOFT TISSUE DISORDERS: ICD-10-CM

## 2017-05-16 LAB
ALBUMIN SERPL ELPH-MCNC: 3.9 G/DL — SIGNIFICANT CHANGE UP (ref 3.3–5.2)
ALP SERPL-CCNC: 103 U/L — SIGNIFICANT CHANGE UP (ref 40–120)
ALT FLD-CCNC: 28 U/L — SIGNIFICANT CHANGE UP
ANION GAP SERPL CALC-SCNC: 9 MMOL/L — SIGNIFICANT CHANGE UP (ref 5–17)
APTT BLD: 39.3 SEC — HIGH (ref 27.5–37.4)
AST SERPL-CCNC: 24 U/L — SIGNIFICANT CHANGE UP
BASOPHILS # BLD AUTO: 0 K/UL — SIGNIFICANT CHANGE UP (ref 0–0.2)
BASOPHILS NFR BLD AUTO: 0.3 % — SIGNIFICANT CHANGE UP (ref 0–2)
BILIRUB SERPL-MCNC: 1 MG/DL — SIGNIFICANT CHANGE UP (ref 0.4–2)
BUN SERPL-MCNC: 28 MG/DL — HIGH (ref 8–20)
CALCIUM SERPL-MCNC: 8.5 MG/DL — LOW (ref 8.6–10.2)
CHLORIDE SERPL-SCNC: 105 MMOL/L — SIGNIFICANT CHANGE UP (ref 98–107)
CK SERPL-CCNC: 59 U/L — SIGNIFICANT CHANGE UP (ref 30–200)
CO2 SERPL-SCNC: 30 MMOL/L — HIGH (ref 22–29)
CREAT SERPL-MCNC: 1.37 MG/DL — HIGH (ref 0.5–1.3)
EOSINOPHIL # BLD AUTO: 0.1 K/UL — SIGNIFICANT CHANGE UP (ref 0–0.5)
EOSINOPHIL NFR BLD AUTO: 1.3 % — SIGNIFICANT CHANGE UP (ref 0–5)
GLUCOSE SERPL-MCNC: 91 MG/DL — SIGNIFICANT CHANGE UP (ref 70–115)
HCT VFR BLD CALC: 38.9 % — LOW (ref 42–52)
HGB BLD-MCNC: 13 G/DL — LOW (ref 14–18)
INR BLD: 1.62 RATIO — HIGH (ref 0.88–1.16)
LYMPHOCYTES # BLD AUTO: 3.6 K/UL — SIGNIFICANT CHANGE UP (ref 1–4.8)
LYMPHOCYTES # BLD AUTO: 48.9 % — SIGNIFICANT CHANGE UP (ref 20–55)
MCHC RBC-ENTMCNC: 32.7 PG — HIGH (ref 27–31)
MCHC RBC-ENTMCNC: 33.4 G/DL — SIGNIFICANT CHANGE UP (ref 32–36)
MCV RBC AUTO: 98 FL — HIGH (ref 80–94)
MONOCYTES # BLD AUTO: 0.4 K/UL — SIGNIFICANT CHANGE UP (ref 0–0.8)
MONOCYTES NFR BLD AUTO: 6 % — SIGNIFICANT CHANGE UP (ref 3–10)
NEUTROPHILS # BLD AUTO: 3.2 K/UL — SIGNIFICANT CHANGE UP (ref 1.8–8)
NEUTROPHILS NFR BLD AUTO: 43.4 % — SIGNIFICANT CHANGE UP (ref 37–73)
NT-PROBNP SERPL-SCNC: 910 PG/ML — HIGH (ref 0–300)
PLATELET # BLD AUTO: 51 K/UL — LOW (ref 150–400)
POTASSIUM SERPL-MCNC: 4 MMOL/L — SIGNIFICANT CHANGE UP (ref 3.5–5.3)
POTASSIUM SERPL-SCNC: 4 MMOL/L — SIGNIFICANT CHANGE UP (ref 3.5–5.3)
PROT SERPL-MCNC: 6.6 G/DL — SIGNIFICANT CHANGE UP (ref 6.6–8.7)
PROTHROM AB SERPL-ACNC: 18 SEC — HIGH (ref 9.8–12.7)
RBC # BLD: 3.97 M/UL — LOW (ref 4.6–6.2)
RBC # FLD: 13.5 % — SIGNIFICANT CHANGE UP (ref 11–15.6)
SODIUM SERPL-SCNC: 144 MMOL/L — SIGNIFICANT CHANGE UP (ref 135–145)
TROPONIN T SERPL-MCNC: <0.01 NG/ML — SIGNIFICANT CHANGE UP (ref 0–0.06)
TROPONIN T SERPL-MCNC: <0.01 NG/ML — SIGNIFICANT CHANGE UP (ref 0–0.06)
WBC # BLD: 7.5 K/UL — SIGNIFICANT CHANGE UP (ref 4.8–10.8)
WBC # FLD AUTO: 7.5 K/UL — SIGNIFICANT CHANGE UP (ref 4.8–10.8)

## 2017-05-16 PROCEDURE — 93970 EXTREMITY STUDY: CPT

## 2017-05-16 PROCEDURE — 71275 CT ANGIOGRAPHY CHEST: CPT | Mod: 26

## 2017-05-16 PROCEDURE — 71010: CPT | Mod: 26

## 2017-05-16 PROCEDURE — 85610 PROTHROMBIN TIME: CPT

## 2017-05-16 PROCEDURE — 93010 ELECTROCARDIOGRAM REPORT: CPT

## 2017-05-16 PROCEDURE — 93970 EXTREMITY STUDY: CPT | Mod: 26

## 2017-05-16 PROCEDURE — 83880 ASSAY OF NATRIURETIC PEPTIDE: CPT

## 2017-05-16 PROCEDURE — 99285 EMERGENCY DEPT VISIT HI MDM: CPT

## 2017-05-16 PROCEDURE — 84484 ASSAY OF TROPONIN QUANT: CPT

## 2017-05-16 PROCEDURE — 71275 CT ANGIOGRAPHY CHEST: CPT

## 2017-05-16 PROCEDURE — 85027 COMPLETE CBC AUTOMATED: CPT

## 2017-05-16 PROCEDURE — 80053 COMPREHEN METABOLIC PANEL: CPT

## 2017-05-16 PROCEDURE — 99284 EMERGENCY DEPT VISIT MOD MDM: CPT | Mod: 25

## 2017-05-16 PROCEDURE — 85730 THROMBOPLASTIN TIME PARTIAL: CPT

## 2017-05-16 PROCEDURE — 71045 X-RAY EXAM CHEST 1 VIEW: CPT

## 2017-05-16 PROCEDURE — 82550 ASSAY OF CK (CPK): CPT

## 2017-05-16 PROCEDURE — 93005 ELECTROCARDIOGRAM TRACING: CPT

## 2017-05-16 RX ORDER — SODIUM CHLORIDE 9 MG/ML
1000 INJECTION INTRAMUSCULAR; INTRAVENOUS; SUBCUTANEOUS ONCE
Qty: 0 | Refills: 0 | Status: COMPLETED | OUTPATIENT
Start: 2017-05-16 | End: 2017-05-16

## 2017-05-16 RX ORDER — SOTALOL HCL 120 MG
80 TABLET ORAL ONCE
Qty: 0 | Refills: 0 | Status: COMPLETED | OUTPATIENT
Start: 2017-05-16 | End: 2017-05-16

## 2017-05-16 RX ADMIN — Medication 80 MILLIGRAM(S): at 19:16

## 2017-05-16 RX ADMIN — SODIUM CHLORIDE 1000 MILLILITER(S): 9 INJECTION INTRAMUSCULAR; INTRAVENOUS; SUBCUTANEOUS at 17:58

## 2017-05-16 NOTE — ED ADULT NURSE NOTE - PMH
Atrial fibrillation, unspecified type    Chronic gout, unspecified cause, unspecified site    Essential hypertension Atrial fibrillation, unspecified type    Chronic gout, unspecified cause, unspecified site    Essential hypertension    Pacemaker    Prostate CA

## 2017-05-16 NOTE — CONSULT NOTE ADULT - ASSESSMENT
Assessment and Plan:    In summary, ALEKSANDAR HOWARD is an 76y Male with Hx of SSS, PAF S/P PPM, on AC, hx of CAD s/p PCI x6 followed by [facility] c/o an episode of SOB and weakness that started yesterday while visiting a friend. He had a CTA of chest to r/o PE, LE Duplex and CXR that was negative. there is no evidence of CHF. Presently he denies any symptoms, no SOB or CP, dizziness or syncope        repeat trop I at the ED  if negative OK to DC home for outpatient stress Myoview this week with primary cardiologist Dr Blackwood  Continue present cardiac therapy    RACHEL DHILLON MD, FACC, [facility]

## 2017-05-16 NOTE — ED ADULT NURSE NOTE - CHIEF COMPLAINT QUOTE
recent DC from [facility] for leg swelling and cellulitis, now has some SOB and states legs more swelling since DC. No chest pain.

## 2017-05-16 NOTE — ED ADULT TRIAGE NOTE - CHIEF COMPLAINT QUOTE
recent DC from [facility] for leg swelling and cellulitis, now has some SOB and states legs more swelling since DC. No chest pain.

## 2017-05-16 NOTE — ED PROVIDER NOTE - OBJECTIVE STATEMENT
75 y/o h/o htn and afib and gout and he says he was admitted with leg swelling and cellulitis and had dopplers that were negative for DVT and he went home on po antibiotics and he finished 2 days ago and he says redness went away but leg swelling left > rt has persisted  and today he got up and felt sob no fever or cough no chills no cp

## 2017-05-16 NOTE — ED ADULT NURSE NOTE - OBJECTIVE STATEMENT
Patient A&OX3. c/o SOB and left leg swelling. No edema noted. Denies chest pain at this time. VSS. CM in place. NSR. Clear BBS. abd soft, nondistended and nontender. moving all extremities well.

## 2017-05-16 NOTE — CONSULT NOTE ADULT - SUBJECTIVE AND OBJECTIVE BOX
Formerly [facility], [facility]                                   [address] [place] [address]                                                      PHONE: [phone]                                                         FAX: [phone]  [email or web address]  ---------------------------------------------------------------------------------------------------------------------------------    Reason for Consult:    HPI:  ALEKSANDAR HOWARD is an 76y Male with Hx of SSS, PAF S/P PPM, on AC, hx of CAD s/p PCI x6 followed by [facility] c/o an episode of SOB and weakness that started yesterday while visiting a friend. He had a CTA of chest to r/o PE, LE Duplex and CXR that was negative. there is no evidence of CHF. Presently he denies any symptoms, no SOB or CP, dizziness or syncope    PAST MEDICAL & SURGICAL HISTORY:  Pacemaker  Prostate CA  Essential hypertension  Chronic gout, unspecified cause, unspecified site  Atrial fibrillation, unspecified type  No significant past surgical history      No Known Allergies      MEDICATIONS  (STANDING):    MEDICATIONS  (PRN):      Social History:  Cigarettes:                    Alchohol:                 Illicit Drug Abuse:      REVIEW OF SYSTEMS:    Constitutional: No fever, weight loss or fatigue  Eyes: No eye pain, visual disturbances, or discharge  ENMT:  No difficulty hearing, tinnitus, vertigo; No sinus or throat pain  Neck: No pain or stiffness  Respiratory: No cough, wheezing, chills or hemoptysis  Cardiovascular: No chest pain, palpitations, shortness of breath, dizziness or leg swelling  Gastrointestinal: No abdominal or epigastric pain. No nausea, vomiting or hematemesis; No diarrhea or constipation. No melena or hematochezia.  Genitourinary: No dysuria, frequency, hematuria or incontinence  Rectal: No pain, hemorrhoids or incontinence  Neurological: No headaches, memory loss, loss of strength, numbness or tremors  Skin: No itching, burning, rashes or lesions   Lymph Nodes: No enlarged glands  Endocrine: No heat or cold intolerance; No hair loss  Musculoskeletal: No joint pain or swelling; No muscle, back or extremity pain  Psychiatric: No depression, anxiety, mood swings or difficulty sleeping  Heme/Lymph: No easy bruising or bleeding gums  Allergy and Immunologic: No hives or eczema    Vital Signs Last 24 Hrs  T(C): 36.7, Max: 36.7 (05-16 @ 18:03)  T(F): 98, Max: 98 (05-16 @ 18:03)  HR: 60 (60 - 60)  BP: 178/77 (135/68 - 178/77)  BP(mean): --  RR: 20 (20 - 22)  SpO2: 100% (99% - 100%)  ICU Vital Signs Last 24 Hrs  ALEKSANDAR HOWARD  I&O's Detail    I&O's Summary    Drug Dosing Weight  ALEKSANDAR HOWARD      PHYSICAL EXAM:  General: Appears well developed, well nourished alert and cooperative.  HEENT: Head; normocephalic, atraumatic.  Eyes: Pupils reactive, cornea wnl.  Neck: Supple, no nodes adenopathy, no NVD or carotid bruit or thyromegaly.  CARDIOVASCULAR: Normal S1 and S2, No murmur, rub, gallop or lift.   LUNGS: No rales, rhonchi or wheeze. Normal breath sounds bilaterally.  ABDOMEN: Soft, nontender without mass or organomegaly. bowel sounds normoactive.  EXTREMITIES: No clubbing, cyanosis or edema. Distal pulses wnl.   SKIN: warm and dry with normal turgor.  NEURO: Alert/oriented x 3/normal motor exam. No pathologic reflexes.    PSYCH: normal affect.        LABS:                        13.0   7.5   )-----------( 51       ( 16 May 2017 13:52 )             38.9     05-16    144  |  105  |  28.0<H>  ----------------------------<  91  4.0   |  30.0<H>  |  1.37<H>    Ca    8.5<L>      16 May 2017 13:52    TPro  6.6  /  Alb  3.9  /  TBili  1.0  /  DBili  x   /  AST  24  /  ALT  28  /  AlkPhos  103  05-16    ALEKSANDAR HOWARD  CARDIAC MARKERS ( 16 May 2017 13:52 )  x     / <0.01 ng/mL / x     / x     / x          PT/INR - ( 16 May 2017 13:52 )   PT: 18.0 sec;   INR: 1.62 ratio         PTT - ( 16 May 2017 13:52 )  PTT:39.3 sec      RADIOLOGY & ADDITIONAL STUDIES:    INTERPRETATION OF TELEMETRY (personally reviewed):    ECG: A PACING IVCD        ACTIVE PROBLEMS:  HEALTH ISSUES - PROBLEM Dx:

## 2017-05-16 NOTE — ED ADULT NURSE REASSESSMENT NOTE - NS ED NURSE REASSESS COMMENT FT1
Pt discharged waiting for son for a .
Patient A&OX3. Denies any pain at this time. CM in place NSR. pt hypertensive at this time. MD aware.

## 2017-10-11 PROCEDURE — 93971 EXTREMITY STUDY: CPT

## 2017-10-11 PROCEDURE — 82570 ASSAY OF URINE CREATININE: CPT

## 2017-10-11 PROCEDURE — 82550 ASSAY OF CK (CPK): CPT

## 2017-10-11 PROCEDURE — 71046 X-RAY EXAM CHEST 2 VIEWS: CPT

## 2017-10-11 PROCEDURE — 36415 COLL VENOUS BLD VENIPUNCTURE: CPT

## 2017-10-11 PROCEDURE — 85027 COMPLETE CBC AUTOMATED: CPT

## 2017-10-11 PROCEDURE — 84105 ASSAY OF URINE PHOSPHORUS: CPT

## 2017-10-11 PROCEDURE — 85610 PROTHROMBIN TIME: CPT

## 2017-10-11 PROCEDURE — 96374 THER/PROPH/DIAG INJ IV PUSH: CPT

## 2017-10-11 PROCEDURE — 80053 COMPREHEN METABOLIC PANEL: CPT

## 2017-10-11 PROCEDURE — 84100 ASSAY OF PHOSPHORUS: CPT

## 2017-10-11 PROCEDURE — 73590 X-RAY EXAM OF LOWER LEG: CPT

## 2017-10-11 PROCEDURE — 99285 EMERGENCY DEPT VISIT HI MDM: CPT | Mod: 25

## 2017-10-11 PROCEDURE — 80048 BASIC METABOLIC PNL TOTAL CA: CPT

## 2017-10-11 PROCEDURE — 84133 ASSAY OF URINE POTASSIUM: CPT

## 2017-10-11 PROCEDURE — 85730 THROMBOPLASTIN TIME PARTIAL: CPT

## 2017-10-11 PROCEDURE — 76775 US EXAM ABDO BACK WALL LIM: CPT

## 2017-10-11 PROCEDURE — 83735 ASSAY OF MAGNESIUM: CPT

## 2017-10-11 PROCEDURE — 83880 ASSAY OF NATRIURETIC PEPTIDE: CPT

## 2017-10-11 PROCEDURE — 84300 ASSAY OF URINE SODIUM: CPT

## 2017-10-11 PROCEDURE — 87040 BLOOD CULTURE FOR BACTERIA: CPT

## 2017-10-11 PROCEDURE — 82436 ASSAY OF URINE CHLORIDE: CPT

## 2017-10-11 PROCEDURE — 97163 PT EVAL HIGH COMPLEX 45 MIN: CPT

## 2017-10-11 PROCEDURE — 76881 US COMPL JOINT R-T W/IMG: CPT

## 2017-11-07 ENCOUNTER — INPATIENT (INPATIENT)
Facility: HOSPITAL | Age: 77
LOS: 1 days | Discharge: HOME | DRG: 312 | End: 2017-11-09
Attending: INTERNAL MEDICINE | Admitting: HOSPITALIST
Payer: MEDICARE

## 2017-11-07 VITALS
DIASTOLIC BLOOD PRESSURE: 74 MMHG | TEMPERATURE: 98 F | SYSTOLIC BLOOD PRESSURE: 154 MMHG | HEART RATE: 67 BPM | RESPIRATION RATE: 18 BRPM | OXYGEN SATURATION: 99 % | HEIGHT: 74 IN | WEIGHT: 229.94 LBS

## 2017-11-07 DIAGNOSIS — R55 SYNCOPE AND COLLAPSE: ICD-10-CM

## 2017-11-07 LAB
ALBUMIN SERPL ELPH-MCNC: 3.6 G/DL — SIGNIFICANT CHANGE UP (ref 3.3–5.2)
ALP SERPL-CCNC: 119 U/L — SIGNIFICANT CHANGE UP (ref 40–120)
ALT FLD-CCNC: 19 U/L — SIGNIFICANT CHANGE UP
ANION GAP SERPL CALC-SCNC: 11 MMOL/L — SIGNIFICANT CHANGE UP (ref 5–17)
APTT BLD: 32.4 SEC — SIGNIFICANT CHANGE UP (ref 27.5–37.4)
AST SERPL-CCNC: 24 U/L — SIGNIFICANT CHANGE UP
BASOPHILS # BLD AUTO: 0 K/UL — SIGNIFICANT CHANGE UP (ref 0–0.2)
BASOPHILS NFR BLD AUTO: 0.4 % — SIGNIFICANT CHANGE UP (ref 0–2)
BILIRUB SERPL-MCNC: 1.4 MG/DL — SIGNIFICANT CHANGE UP (ref 0.4–2)
BUN SERPL-MCNC: 23 MG/DL — HIGH (ref 8–20)
CALCIUM SERPL-MCNC: 9 MG/DL — SIGNIFICANT CHANGE UP (ref 8.6–10.2)
CHLORIDE SERPL-SCNC: 102 MMOL/L — SIGNIFICANT CHANGE UP (ref 98–107)
CO2 SERPL-SCNC: 28 MMOL/L — SIGNIFICANT CHANGE UP (ref 22–29)
CREAT SERPL-MCNC: 1.52 MG/DL — HIGH (ref 0.5–1.3)
EOSINOPHIL # BLD AUTO: 0.2 K/UL — SIGNIFICANT CHANGE UP (ref 0–0.5)
EOSINOPHIL NFR BLD AUTO: 3.1 % — SIGNIFICANT CHANGE UP (ref 0–5)
GLUCOSE SERPL-MCNC: 118 MG/DL — HIGH (ref 70–115)
HCT VFR BLD CALC: 40.5 % — LOW (ref 42–52)
HGB BLD-MCNC: 13.8 G/DL — LOW (ref 14–18)
INR BLD: 1.05 RATIO — SIGNIFICANT CHANGE UP (ref 0.88–1.16)
LYMPHOCYTES # BLD AUTO: 3.1 K/UL — SIGNIFICANT CHANGE UP (ref 1–4.8)
LYMPHOCYTES # BLD AUTO: 43.3 % — SIGNIFICANT CHANGE UP (ref 20–55)
MCHC RBC-ENTMCNC: 33.2 PG — HIGH (ref 27–31)
MCHC RBC-ENTMCNC: 34.1 G/DL — SIGNIFICANT CHANGE UP (ref 32–36)
MCV RBC AUTO: 97.4 FL — HIGH (ref 80–94)
MONOCYTES # BLD AUTO: 0.5 K/UL — SIGNIFICANT CHANGE UP (ref 0–0.8)
MONOCYTES NFR BLD AUTO: 7.2 % — SIGNIFICANT CHANGE UP (ref 3–10)
NEUTROPHILS # BLD AUTO: 3.2 K/UL — SIGNIFICANT CHANGE UP (ref 1.8–8)
NEUTROPHILS NFR BLD AUTO: 45.7 % — SIGNIFICANT CHANGE UP (ref 37–73)
NT-PROBNP SERPL-SCNC: 2460 PG/ML — HIGH (ref 0–300)
PLATELET # BLD AUTO: 77 K/UL — LOW (ref 150–400)
POTASSIUM SERPL-MCNC: 4.5 MMOL/L — SIGNIFICANT CHANGE UP (ref 3.5–5.3)
POTASSIUM SERPL-SCNC: 4.5 MMOL/L — SIGNIFICANT CHANGE UP (ref 3.5–5.3)
PROT SERPL-MCNC: 6.4 G/DL — LOW (ref 6.6–8.7)
PROTHROM AB SERPL-ACNC: 11.6 SEC — SIGNIFICANT CHANGE UP (ref 9.8–12.7)
RBC # BLD: 4.16 M/UL — LOW (ref 4.6–6.2)
RBC # FLD: 13.9 % — SIGNIFICANT CHANGE UP (ref 11–15.6)
SODIUM SERPL-SCNC: 141 MMOL/L — SIGNIFICANT CHANGE UP (ref 135–145)
TROPONIN T SERPL-MCNC: <0.01 NG/ML — SIGNIFICANT CHANGE UP (ref 0–0.06)
TSH SERPL-MCNC: 2.15 UIU/ML — SIGNIFICANT CHANGE UP (ref 0.27–4.2)
TSH SERPL-MCNC: 3.54 UIU/ML — SIGNIFICANT CHANGE UP (ref 0.27–4.2)
WBC # BLD: 7.1 K/UL — SIGNIFICANT CHANGE UP (ref 4.8–10.8)
WBC # FLD AUTO: 7.1 K/UL — SIGNIFICANT CHANGE UP (ref 4.8–10.8)

## 2017-11-07 PROCEDURE — 99223 1ST HOSP IP/OBS HIGH 75: CPT

## 2017-11-07 PROCEDURE — 70450 CT HEAD/BRAIN W/O DYE: CPT | Mod: 26

## 2017-11-07 PROCEDURE — 71010: CPT | Mod: 26

## 2017-11-07 PROCEDURE — 93010 ELECTROCARDIOGRAM REPORT: CPT

## 2017-11-07 PROCEDURE — 99285 EMERGENCY DEPT VISIT HI MDM: CPT

## 2017-11-07 RX ORDER — PANTOPRAZOLE SODIUM 20 MG/1
40 TABLET, DELAYED RELEASE ORAL
Qty: 0 | Refills: 0 | Status: DISCONTINUED | OUTPATIENT
Start: 2017-11-07 | End: 2017-11-09

## 2017-11-07 RX ORDER — APIXABAN 2.5 MG/1
1 TABLET, FILM COATED ORAL
Qty: 0 | Refills: 0 | COMMUNITY

## 2017-11-07 RX ORDER — ATENOLOL 25 MG/1
1 TABLET ORAL
Qty: 0 | Refills: 0 | COMMUNITY

## 2017-11-07 RX ORDER — ALLOPURINOL 300 MG
300 TABLET ORAL DAILY
Qty: 0 | Refills: 0 | Status: DISCONTINUED | OUTPATIENT
Start: 2017-11-08 | End: 2017-11-09

## 2017-11-07 RX ORDER — ASPIRIN/CALCIUM CARB/MAGNESIUM 324 MG
325 TABLET ORAL DAILY
Qty: 0 | Refills: 0 | Status: DISCONTINUED | OUTPATIENT
Start: 2017-11-08 | End: 2017-11-09

## 2017-11-07 RX ORDER — HEPARIN SODIUM 5000 [USP'U]/ML
5000 INJECTION INTRAVENOUS; SUBCUTANEOUS EVERY 8 HOURS
Qty: 0 | Refills: 0 | Status: DISCONTINUED | OUTPATIENT
Start: 2017-11-07 | End: 2017-11-09

## 2017-11-07 RX ORDER — ATORVASTATIN CALCIUM 80 MG/1
20 TABLET, FILM COATED ORAL AT BEDTIME
Qty: 0 | Refills: 0 | Status: DISCONTINUED | OUTPATIENT
Start: 2017-11-07 | End: 2017-11-09

## 2017-11-07 RX ORDER — SOTALOL HCL 120 MG
80 TABLET ORAL EVERY 12 HOURS
Qty: 0 | Refills: 0 | Status: DISCONTINUED | OUTPATIENT
Start: 2017-11-07 | End: 2017-11-09

## 2017-11-07 RX ORDER — CLOPIDOGREL BISULFATE 75 MG/1
75 TABLET, FILM COATED ORAL DAILY
Qty: 0 | Refills: 0 | Status: DISCONTINUED | OUTPATIENT
Start: 2017-11-07 | End: 2017-11-09

## 2017-11-07 RX ORDER — LEVOTHYROXINE SODIUM 125 MCG
50 TABLET ORAL DAILY
Qty: 0 | Refills: 0 | Status: DISCONTINUED | OUTPATIENT
Start: 2017-11-07 | End: 2017-11-09

## 2017-11-07 RX ADMIN — CLOPIDOGREL BISULFATE 75 MILLIGRAM(S): 75 TABLET, FILM COATED ORAL at 18:34

## 2017-11-07 RX ADMIN — ATORVASTATIN CALCIUM 20 MILLIGRAM(S): 80 TABLET, FILM COATED ORAL at 22:13

## 2017-11-07 NOTE — ED PROVIDER NOTE - GASTROINTESTINAL NEGATIVE STATEMENT, MLM
no abdominal pain, no bloating, no constipation, no diarrhea,  no vomiting except hx of colon cancer

## 2017-11-07 NOTE — ED ADULT TRIAGE NOTE - CHIEF COMPLAINT QUOTE
Patient BIBA, A/Ox3, was working this morning and suddenly became weak w/ SOB and nausea.  Symptoms began about 20mins ago.

## 2017-11-07 NOTE — H&P ADULT - HISTORY OF PRESENT ILLNESS
75 y/o male with history of Afib on A/C, SSS s/p PPM, CAD s/p multiple stents, HTN, HLD, Gout, Hypothyroidism, remote history of colon cancer s/p surgery/chemo 6 years ago, presented to ED with presyncope episode. Pt reports he was feeling well up until this morning when he became very light headed / dizzy associated SOB and nauseas. Pt states he was sitting in a chair and felt suddenly very weak. Pt denied LOC, fall head injury. No chest pain, palpitation fever, chills, vomiting, bowel/bladder habits are normal. Pt further states he had 2-3 episodes of syncope in last 2 years. 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 Pt denied LOC, fall, head injury, tongue bite, bowel/bladder incontinence. No chest pain, palpitation fever, chills, vomiting, bowel/bladder habits are normal. Pt further states he had 3 episodes of syncope in last 2 years and work up being negative. During my evaluation patient completely asymptomatic reports he is at baseline. In ED patient note to have high proBNP, CXR did not sow congestion.

## 2017-11-07 NOTE — ED ADULT NURSE NOTE - PMH
Atrial fibrillation, unspecified type    Chronic gout, unspecified cause, unspecified site    Essential hypertension    Pacemaker    Prostate CA

## 2017-11-07 NOTE — H&P ADULT - NSHPPHYSICALEXAM_GEN_ALL_CORE
PHYSICAL EXAM:    Vital Signs Last 24 Hrs  T(C): 36.8 (07 Nov 2017 11:30), Max: 36.8 (07 Nov 2017 11:30)  T(F): 98.2 (07 Nov 2017 11:30), Max: 98.2 (07 Nov 2017 11:30)  HR: 59 (07 Nov 2017 11:30) (59 - 67)  BP: 154/69 (07 Nov 2017 11:30) (154/69 - 154/74)  BP(mean): --  RR: 18 (07 Nov 2017 11:30) (18 - 18)  SpO2: 99% (07 Nov 2017 11:30) (99% - 99%)    GENERAL: Pt lying comfortably, NAD.  ENMT: PERRL, +EOMI.  NECK: soft, Supple, No JVD,   CHEST/LUNG: Clear to auscultation bilaterally; No wheezing.  HEART: S1S2+, Regular rate and rhythm; No murmurs.  ABDOMEN: Soft, Nontender, Nondistended; Bowel sounds present.  MUSCULOSKELETAL: Normal range of motion.  SKIN: No rashes or lesions.  NEURO: AAOX3, no focal deficits, no motor r sensory loss.  PSYCH: normal mood.

## 2017-11-07 NOTE — ED PROVIDER NOTE - PROGRESS NOTE DETAILS
spoke with Dr. López and he will evaluate pt in the ER interrogated and no events cardiology Dr Hawkins in ed and rec admit and he will follow for cardiology Dr Leiva called for admission Dr Varner called for admission Dr Varner called for admission and case discussed and he request please order ct head and he will f/u all test results and direct further care

## 2017-11-07 NOTE — PATIENT PROFILE ADULT. - NS TRANSFER PATIENT BELONGINGS
Wrist Watch/Clothing/pants, socks, shoes, shirt with bartholomew, chain with pendant, cane/Cell Phone/PDA (specify)

## 2017-11-07 NOTE — ED PROVIDER NOTE - OBJECTIVE STATEMENT
Pt is a 77 y/o male with a PMHx of a-fib, 7 stents, HTN, CHF, a pacemaker first placeD in 1993 with the most recent a [facility] in 2014, who has experienced 3 previous episodes of syncope over the last 2 years, was feeling well up until this morning when he became very light headed, SOB, dizzy, and nauseas and almost fainted. Pt states he was sitting in a chair and felt suddenly very weak. No LOC  and did not fall or hit head. Woke up exceptionally early this morning to work and did not eat.   Cardiologist is Dr. Ashton. Had a stressed test done this year. Was seen last month and told he was doing fine.   Denies chest tightness, fever, chills, vomiting, calf tenderness, LOC. Pt is a 77 y/o male with a PMHx of a-fib, 7 stents, HTN, CHF, a pacemaker first placed in 1993 with the most recent a [facility] in 2014, who has experienced 3 previous episodes of syncope over the last 2 years, was feeling well up until this morning when he became very light headed, SOB, dizzy, and nauseas and almost fainted. Pt states he was sitting in a chair and felt suddenly very weak. No LOC  and did not fall or hit head. Woke up exceptionally early this morning to work and did not eat.   Cardiologist is Dr. Ashton. Had a stressed test done this year. Was seen last month and told he was doing fine.   Denies chest tightness, fever, chills, vomiting, calf tenderness, LOC.

## 2017-11-07 NOTE — ED PROVIDER NOTE - INTERPRETATION
atrial paced  rhythm with prolonged conduction/pacemaker: good capture, regular rhythm and appropriate intervals.

## 2017-11-07 NOTE — H&P ADULT - NSHPLABSRESULTS_GEN_ALL_CORE
LABS:                        13.8   7.1   )-----------( 77       ( 07 Nov 2017 08:14 )             40.5     11-07    141  |  102  |  23.0<H>  ----------------------------<  118<H>  4.5   |  28.0  |  1.52<H>    Ca    9.0      07 Nov 2017 08:14    TPro  6.4<L>  /  Alb  3.6  /  TBili  1.4  /  DBili  x   /  AST  24  /  ALT  19  /  AlkPhos  119  11-07    PT/INR - ( 07 Nov 2017 08:14 )   PT: 11.6 sec;   INR: 1.05 ratio         PTT - ( 07 Nov 2017 08:14 )  PTT:32.4 sec    LIVER FUNCTIONS - ( 07 Nov 2017 08:14 )  Alb: 3.6 g/dL / Pro: 6.4 g/dL / ALK PHOS: 119 U/L / ALT: 19 U/L / AST: 24 U/L / GGT: x               CARDIAC MARKERS ( 07 Nov 2017 08:14 )  x     / <0.01 ng/mL / x     / x     / x

## 2017-11-07 NOTE — ED PROVIDER NOTE - NEUROLOGICAL, MLM
Alert and oriented, no focal deficits, no motor or sensory deficits except appearing weak and confused.

## 2017-11-07 NOTE — ED PROVIDER NOTE - SKIN NEGATIVE STATEMENT, MLM
no abrasions, no jaundice, no lesions, no pruritis, and no rashes except ulcers in lower extremities

## 2017-11-07 NOTE — ED ADULT NURSE REASSESSMENT NOTE - NS ED NURSE REASSESS COMMENT FT1
Patient A&OX3. Denies any pain or discomfort at this time. VSS. CM in place. NSB. safety maintained.

## 2017-11-07 NOTE — ED ADULT NURSE NOTE - OBJECTIVE STATEMENT
Patient A&OX3, c/o SOB, nausea and weakness. Pt states that he was working this morning and suddenly became weak with SOB. Hx cardiac X 7 stents. VSS. CM in place.

## 2017-11-07 NOTE — CONSULT NOTE ADULT - SUBJECTIVE AND OBJECTIVE BOX
[facility], THE HEART CENTER                                   [address] [place] [address]                                                      PHONE: [phone]                                                         FAX: [phone]  [email or web address]  ---------------------------------------------------------------------------------------------------------------------------------    HPI:  ALEKSANDAR HOWARD is an 76y Male PMHX HTN, HLD, CHF, Afib, PPM     PAST MEDICAL & SURGICAL HISTORY:  Alzheimer disease  CHF (congestive heart failure)  Pacemaker  Prostate CA  Essential hypertension  Chronic gout, unspecified cause, unspecified site  Atrial fibrillation, unspecified type  No significant past surgical history      No Known Allergies      MEDICATIONS  (STANDING):    MEDICATIONS  (PRN):      Family History: Pt denies hx of early cad, SCD, or congenital heart disease.      Social History:  Cigarettes:no                    Alchohol:          no       Illicit Drug Abuse:  no    ROS:  Constitutional: No fever, weight loss or fatigue  Eyes: No eye pain, visual disturbances, or discharge  ENMT:  No difficulty hearing, tinnitus, vertigo; No sinus or throat pain  Neck: No pain or stiffness  Respiratory: No cough, wheezing, chills or hemoptysis  Cardiovascular: No chest pain, palpitations, shortness of breath, dizziness or leg swelling  Gastrointestinal: No abdominal or epigastric pain. No nausea, vomiting or hematemesis; No diarrhea or constipation. No melena or hematochezia.  Genitourinary: No dysuria, frequency, hematuria or incontinence  Rectal: No pain, hemorrhoids or incontinence  Neurological: No headaches, memory loss, loss of strength, numbness or tremors  Skin: No itching, burning, rashes or lesions   Lymph Nodes: No enlarged glands  Endocrine: No heat or cold intolerance; No hair loss  Musculoskeletal: No joint pain or swelling; No muscle, back or extremity pain  Psychiatric: No depression, anxiety, mood swings or difficulty sleeping  Heme/Lymph: No easy bruising or bleeding gums  Allergy and Immunologic: No hives or eczema    Vital Signs Last 24 Hrs  T(C): 36.8 (07 Nov 2017 11:30), Max: 36.8 (07 Nov 2017 11:30)  T(F): 98.2 (07 Nov 2017 11:30), Max: 98.2 (07 Nov 2017 11:30)  HR: 59 (07 Nov 2017 11:30) (59 - 67)  BP: 154/69 (07 Nov 2017 11:30) (154/69 - 154/74)  BP(mean): --  RR: 18 (07 Nov 2017 11:30) (18 - 18)  SpO2: 99% (07 Nov 2017 11:30) (99% - 99%)  ICU Vital Signs Last 24 Hrs  ALEKSANDAR HOWARD  I&O's Detail    I&O's Summary    Drug Dosing Weight  ALEKSANDAR HOWARD      PHYSICAL EXAM:  General: Appears well developed, well nourished alert and cooperative.  HEENT: Head; normocephalic, atraumatic.  Eyes: Pupils reactive, cornea wnl.  Neck: Supple, no nodes adenopathy, no NVD or carotid bruit or thyromegaly.  CARDIOVASCULAR: Normal S1 and S2, No murmur, rub, gallop or lift.   LUNGS: No rales, rhonchi or wheeze. Normal breath sounds bilaterally.  ABDOMEN: Soft, nontender without mass or organomegaly. bowel sounds normoactive.  EXTREMITIES: No clubbing, cyanosis or edema. Distal pulses wnl.   SKIN: warm and dry with normal turgor.  NEURO: Alert/oriented x 3/normal motor exam. No pathologic reflexes.    PSYCH: normal affect.        LABS:                        13.8   7.1   )-----------( 77       ( 07 Nov 2017 08:14 )             40.5     11-07    141  |  102  |  23.0<H>  ----------------------------<  118<H>  4.5   |  28.0  |  1.52<H>    Ca    9.0      07 Nov 2017 08:14    TPro  6.4<L>  /  Alb  3.6  /  TBili  1.4  /  DBili  x   /  AST  24  /  ALT  19  /  AlkPhos  119  11-07    ALEKSANDAR HOWARD  CARDIAC MARKERS ( 07 Nov 2017 08:14 )  x     / <0.01 ng/mL / x     / x     / x          PT/INR - ( 07 Nov 2017 08:14 )   PT: 11.6 sec;   INR: 1.05 ratio         PTT - ( 07 Nov 2017 08:14 )  PTT:32.4 sec      RADIOLOGY & ADDITIONAL STUDIES:    INTERPRETATION OF TELEMETRY (personally reviewed):    ECG:     ECHO: no records    STRESS TEST: no records    CARDIAC CATHETERIZATION: no records    Assessment and Plan:  In summary, ALEKSANDAR HOWARD is an 76y Male with past medical history significant for       Thank you for allowing [facility] to participate in the care of this patient.  Please feel free to call with any questions or concerns. [facility], THE HEART CENTER                                   [address] [place] [address]                                                      PHONE: [phone]                                                         FAX: [phone]  [email or web address]  ---------------------------------------------------------------------------------------------------------------------------------    HPI:  ALEKSANDAR HOWARD is an 76y Male PMHX HTN, HLD, CHF, Afib, PPM who presented to [facility] ED complaining of CP.  CP     PAST MEDICAL & SURGICAL HISTORY:  Alzheimer disease  CHF (congestive heart failure)  Pacemaker  Prostate CA  Essential hypertension  Chronic gout, unspecified cause, unspecified site  Atrial fibrillation, unspecified type  No significant past surgical history      No Known Allergies      MEDICATIONS  (STANDING):    MEDICATIONS  (PRN):      Family History: Pt denies hx of early cad, SCD, or congenital heart disease.      Social History:  Cigarettes:no                    Alchohol:          no       Illicit Drug Abuse:  no    ROS:  Constitutional: No fever, weight loss or fatigue  Eyes: No eye pain, visual disturbances, or discharge  ENMT:  No difficulty hearing, tinnitus, vertigo; No sinus or throat pain  Neck: No pain or stiffness  Respiratory: No cough, wheezing, chills or hemoptysis  Cardiovascular: No chest pain, palpitations, shortness of breath, dizziness or leg swelling  Gastrointestinal: No abdominal or epigastric pain. No nausea, vomiting or hematemesis; No diarrhea or constipation. No melena or hematochezia.  Genitourinary: No dysuria, frequency, hematuria or incontinence  Rectal: No pain, hemorrhoids or incontinence  Neurological: No headaches, memory loss, loss of strength, numbness or tremors  Skin: No itching, burning, rashes or lesions   Lymph Nodes: No enlarged glands  Endocrine: No heat or cold intolerance; No hair loss  Musculoskeletal: No joint pain or swelling; No muscle, back or extremity pain  Psychiatric: No depression, anxiety, mood swings or difficulty sleeping  Heme/Lymph: No easy bruising or bleeding gums  Allergy and Immunologic: No hives or eczema    Vital Signs Last 24 Hrs  T(C): 36.8 (07 Nov 2017 11:30), Max: 36.8 (07 Nov 2017 11:30)  T(F): 98.2 (07 Nov 2017 11:30), Max: 98.2 (07 Nov 2017 11:30)  HR: 59 (07 Nov 2017 11:30) (59 - 67)  BP: 154/69 (07 Nov 2017 11:30) (154/69 - 154/74)  BP(mean): --  RR: 18 (07 Nov 2017 11:30) (18 - 18)  SpO2: 99% (07 Nov 2017 11:30) (99% - 99%)  ICU Vital Signs Last 24 Hrs  ALEKSANDAR HOWARD  I&O's Detail    I&O's Summary    Drug Dosing Weight  ALEKSANDAR HOWARD      PHYSICAL EXAM:  General: Appears well developed, well nourished alert and cooperative.  HEENT: Head; normocephalic, atraumatic.  Eyes: Pupils reactive, cornea wnl.  Neck: Supple, no nodes adenopathy, no NVD or carotid bruit or thyromegaly.  CARDIOVASCULAR: Normal S1 and S2, No murmur, rub, gallop or lift.   LUNGS: No rales, rhonchi or wheeze. Normal breath sounds bilaterally.  ABDOMEN: Soft, nontender without mass or organomegaly. bowel sounds normoactive.  EXTREMITIES: No clubbing, cyanosis or edema. Distal pulses wnl.   SKIN: warm and dry with normal turgor.  NEURO: Alert/oriented x 3/normal motor exam. No pathologic reflexes.    PSYCH: normal affect.        LABS:                        13.8   7.1   )-----------( 77       ( 07 Nov 2017 08:14 )             40.5     11-07    141  |  102  |  23.0<H>  ----------------------------<  118<H>  4.5   |  28.0  |  1.52<H>    Ca    9.0      07 Nov 2017 08:14    TPro  6.4<L>  /  Alb  3.6  /  TBili  1.4  /  DBili  x   /  AST  24  /  ALT  19  /  AlkPhos  119  11-07    ALEKSANDAR HOWARD  CARDIAC MARKERS ( 07 Nov 2017 08:14 )  x     / <0.01 ng/mL / x     / x     / x          PT/INR - ( 07 Nov 2017 08:14 )   PT: 11.6 sec;   INR: 1.05 ratio         PTT - ( 07 Nov 2017 08:14 )  PTT:32.4 sec      RADIOLOGY & ADDITIONAL STUDIES:    INTERPRETATION OF TELEMETRY (personally reviewed):    ECG:     ECHO: no records    STRESS TEST: no records    CARDIAC CATHETERIZATION: no records    Assessment and Plan:  In summary, ALEKSANDAR HOWARD is an 76y Male with past medical history significant for       Thank you for allowing [facility] to participate in the care of this patient.  Please feel free to call with any questions or concerns. [facility], THE HEART CENTER                                   [address] [place] [address]                                                      PHONE: [phone]                                                         FAX: [phone]  [email or web address]  ---------------------------------------------------------------------------------------------------------------------------------    HPI:  ALEKSANDAR HOWARD is an 76y Male PMHX HTN, HLD, CHF, Afib s.p watchman device, PPM  CAD s/p PCI with total 7 JACQUI, who presented to [facility] ED complaining of near syncope.  Pt states that this am he was sitting at hisdesk when he became lightheaded and dizzy.  Pt fallows with [facility].      PAST MEDICAL & SURGICAL HISTORY:  Alzheimer disease  CHF (congestive heart failure)  Pacemaker  Prostate CA  Essential hypertension  Chronic gout, unspecified cause, unspecified site  Atrial fibrillation, unspecified type  No significant past surgical history      No Known Allergies      MEDICATIONS  (STANDING):    MEDICATIONS  (PRN):      Family History: Pt denies hx of early cad, SCD, or congenital heart disease.      Social History:  Cigarettes:no                    Alchohol:          no       Illicit Drug Abuse:  no    ROS:  Constitutional: No fever, weight loss or fatigue  Eyes: No eye pain, visual disturbances, or discharge  ENMT:  No difficulty hearing, tinnitus, vertigo; No sinus or throat pain  Neck: No pain or stiffness  Respiratory: No cough, wheezing, chills or hemoptysis  Cardiovascular: No chest pain, palpitations, shortness of breath, dizziness or leg swelling  Gastrointestinal: No abdominal or epigastric pain. No nausea, vomiting or hematemesis; No diarrhea or constipation. No melena or hematochezia.  Genitourinary: No dysuria, frequency, hematuria or incontinence  Rectal: No pain, hemorrhoids or incontinence  Neurological: No headaches, memory loss, loss of strength, numbness or tremors  Skin: No itching, burning, rashes or lesions   Lymph Nodes: No enlarged glands  Endocrine: No heat or cold intolerance; No hair loss  Musculoskeletal: No joint pain or swelling; No muscle, back or extremity pain  Psychiatric: No depression, anxiety, mood swings or difficulty sleeping  Heme/Lymph: No easy bruising or bleeding gums  Allergy and Immunologic: No hives or eczema    Vital Signs Last 24 Hrs  T(C): 36.8 (07 Nov 2017 11:30), Max: 36.8 (07 Nov 2017 11:30)  T(F): 98.2 (07 Nov 2017 11:30), Max: 98.2 (07 Nov 2017 11:30)  HR: 59 (07 Nov 2017 11:30) (59 - 67)  BP: 154/69 (07 Nov 2017 11:30) (154/69 - 154/74)  BP(mean): --  RR: 18 (07 Nov 2017 11:30) (18 - 18)  SpO2: 99% (07 Nov 2017 11:30) (99% - 99%)  ICU Vital Signs Last 24 Hrs  ALEKSANDAR HOWARD  I&O's Detail    I&O's Summary    Drug Dosing Weight  ALEKSANDAR HOWARD      PHYSICAL EXAM:  General: Appears well developed, well nourished alert and cooperative.  HEENT: Head; normocephalic, atraumatic.  Eyes: Pupils reactive, cornea wnl.  Neck: Supple, no nodes adenopathy, no NVD or carotid bruit or thyromegaly.  CARDIOVASCULAR: Normal S1 and S2, No murmur, rub, gallop or lift.   LUNGS: No rales, rhonchi or wheeze. Normal breath sounds bilaterally.  ABDOMEN: Soft, nontender without mass or organomegaly. bowel sounds normoactive.  EXTREMITIES: No clubbing, cyanosis or edema. Distal pulses wnl.   SKIN: warm and dry with normal turgor.  NEURO: Alert/oriented x 3/normal motor exam. No pathologic reflexes.    PSYCH: normal affect.        LABS:                        13.8   7.1   )-----------( 77       ( 07 Nov 2017 08:14 )             40.5     11-07    141  |  102  |  23.0<H>  ----------------------------<  118<H>  4.5   |  28.0  |  1.52<H>    Ca    9.0      07 Nov 2017 08:14    TPro  6.4<L>  /  Alb  3.6  /  TBili  1.4  /  DBili  x   /  AST  24  /  ALT  19  /  AlkPhos  119  11-07    ALEKSANDAR HOWARD  CARDIAC MARKERS ( 07 Nov 2017 08:14 )  x     / <0.01 ng/mL / x     / x     / x          PT/INR - ( 07 Nov 2017 08:14 )   PT: 11.6 sec;   INR: 1.05 ratio         PTT - ( 07 Nov 2017 08:14 )  PTT:32.4 sec      RADIOLOGY & ADDITIONAL STUDIES:    INTERPRETATION OF TELEMETRY (personally reviewed):    ECG:     ECHO: no records    STRESS TEST: no records    CARDIAC CATHETERIZATION: no records    Assessment and Plan:  In summary, ALEKSANDAR HOWARD is an 76y Male PMHX HTN, HLD, CHF, Afib s.p watchman device, PPM  CAD s/p PCI with total 7 JACQUI, who presented to [facility] ED complaining of near syncope.  Pt states that this am he was sitting at hisdesk when he became lightheaded and dizzy.  Pt follows with [facility].      -tele monitoring  -Device interrogation-no events -working appropriately  -echo   -med rec from [facility]            Thank you for allowing [facility] to participate in the care of this patient.  Please feel free to call with any questions or concerns.

## 2017-11-07 NOTE — H&P ADULT - PMH
Alzheimer disease    Atrial fibrillation, unspecified type    CHF (congestive heart failure)    Chronic gout, unspecified cause, unspecified site    Essential hypertension    Pacemaker    Prostate CA

## 2017-11-07 NOTE — ED PROVIDER NOTE - PMH
Atrial fibrillation, unspecified type    Chronic gout, unspecified cause, unspecified site    Essential hypertension    Pacemaker    Prostate CA Alzheimer disease    Atrial fibrillation, unspecified type    CHF (congestive heart failure)    Chronic gout, unspecified cause, unspecified site    Essential hypertension    Pacemaker    Prostate CA

## 2017-11-07 NOTE — H&P ADULT - ASSESSMENT
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 Pt denied LOC, fall, head injury, tongue bite, bowel/bladder incontinence. In ED patient note to have high pro-BNP, CXR did not show congestion. Pacemaker interrogated in ED and cardiology consulted. Pt admitted to medicine for further work up and management. Presyncope could be related to underlying CMP, no prior EF known.    Plan:    Presyncope with brief SOB:  - Pt stable now, no more SOB. Unclear etiology at this, could be from underlying CMP, r/o arrythmia and neurological cause.  - Pacemaker interrogated in ED, per ED staff was appropriately functioning, I could not find report.   - Keep on telemetry, fall precaution, neuro-check. Obtain CT head, TTE.  - Cardiology consulted from ED, pending bedside evaluation.    CVS: hx of Afib s/p watchman CAD s/p 7 stents, SSS s/p PPM, questionable CHF:  - Pt with no chest pain or sob for now, euvolemic on exam.  - Obtain echo, keep on fluid restriction.  - Resume home medication as reconciled. Pt sure he is not on Eliquis any more s/p watchman. ( Pt reports Dr Ashton has changed 2 medication recently 2-3 weeks ago and he is not sure about change, has old medication list, reconciled and resumed. Please verify medication list)    HTN / HLD/ Hypothyroidism / Gout:  - Resume home medication    Hx of colon cancer / Spleen cancer:  - Outpatient follow up with his oncologist.     CKD:  - Monitor renal function.    DVT ppx:  - Start heparin subc after CT head. 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 Pt denied LOC, fall, head injury, tongue bite, bowel/bladder incontinence. In ED patient note to have high pro-BNP, CXR did not show congestion. Pacemaker interrogated in ED and cardiology consulted. Pt admitted to medicine for further work up and management. Presyncope could be related to underlying CMP, EF unknown.    Plan:    Presyncope with brief SOB:  - Pt stable now, no more SOB. Unclear etiology at this, could be from underlying CMP, r/o arrythmia and neurological cause.  - Pacemaker interrogated in ED, per ED staff was appropriately functioning, I could not find report.   - Keep on telemetry, fall precaution, neuro-check. Obtain CT head, TTE.  - Cardiology consulted from ED, pending bedside evaluation.    CVS: hx of Afib s/p watchman CAD s/p 7 stents, SSS s/p PPM, questionable CHF:  - Pt with no chest pain or sob for now, euvolemic on exam.  - Obtain echo, keep on fluid restriction.  - Resume home medication as reconciled. Pt sure he is not on Eliquis any more s/p watchman. ( Pt reports Dr Ashton has changed 2 medication recently 2-3 weeks ago and he is not sure about change, has old medication list, reconciled and resumed. Please verify medication list)    HTN / HLD/ Hypothyroidism / Gout:  - Resume home medication    Hx of colon cancer / Spleen cancer:  - Outpatient follow up with his oncologist.     CKD:  - Monitor renal function.    DVT ppx:  - Start heparin subc after CT head. 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 Pt denied LOC, fall, head injury, tongue bite, bowel/bladder incontinence. In ED patient note to have high pro-BNP, CXR did not show congestion. Pacemaker interrogated in ED and cardiology consulted. Pt admitted to medicine for further work up and management. Presyncope could be related to underlying CMP, EF unknown.    Plan:    Presyncope with brief SOB:  - Pt stable now, no more SOB. Unclear etiology at this, could be from underlying CMP, r/o arrythmia and neurological cause.  - Pacemaker interrogated in ED, per ED staff was appropriately functioning, I could not find report.   - Keep on telemetry, fall precaution, neuro-check. Obtain CT head, CD, TTE.  - Cardiology consulted from ED, pending bedside evaluation.    CVS: hx of Afib s/p watchman CAD s/p 7 stents, SSS s/p PPM, questionable CHF:  - Pt with no chest pain or sob for now, euvolemic on exam.  - Obtain echo, keep on fluid restriction.  - Resume home medication as reconciled. Pt sure he is not on Eliquis any more s/p watchman. ( Pt reports Dr Ashton has changed 2 medication recently 2-3 weeks ago and he is not sure about change, has old medication list, reconciled and resumed. Please verify medication list)    HTN / HLD/ Hypothyroidism / Gout:  - Resume home medication    Hx of colon cancer / Spleen cancer:  - Outpatient follow up with his oncologist.     CKD:  - Monitor renal function.    DVT ppx:  - Start heparin subc after CT head.

## 2017-11-07 NOTE — ED CLERICAL - CLERICAL COMMENTS
[place] [facility] called for consult [place] [facility] called for consult , st [facility] called to interrogate device

## 2017-11-08 LAB
ANION GAP SERPL CALC-SCNC: 10 MMOL/L — SIGNIFICANT CHANGE UP (ref 5–17)
BUN SERPL-MCNC: 25 MG/DL — HIGH (ref 8–20)
CALCIUM SERPL-MCNC: 8.7 MG/DL — SIGNIFICANT CHANGE UP (ref 8.6–10.2)
CHLORIDE SERPL-SCNC: 103 MMOL/L — SIGNIFICANT CHANGE UP (ref 98–107)
CO2 SERPL-SCNC: 27 MMOL/L — SIGNIFICANT CHANGE UP (ref 22–29)
CREAT SERPL-MCNC: 1.35 MG/DL — HIGH (ref 0.5–1.3)
GLUCOSE SERPL-MCNC: 93 MG/DL — SIGNIFICANT CHANGE UP (ref 70–115)
HCT VFR BLD CALC: 38.7 % — LOW (ref 42–52)
HGB BLD-MCNC: 13.2 G/DL — LOW (ref 14–18)
MCHC RBC-ENTMCNC: 32.9 PG — HIGH (ref 27–31)
MCHC RBC-ENTMCNC: 34.1 G/DL — SIGNIFICANT CHANGE UP (ref 32–36)
MCV RBC AUTO: 96.5 FL — HIGH (ref 80–94)
PLATELET # BLD AUTO: 70 K/UL — LOW (ref 150–400)
POTASSIUM SERPL-MCNC: 3.8 MMOL/L — SIGNIFICANT CHANGE UP (ref 3.5–5.3)
POTASSIUM SERPL-SCNC: 3.8 MMOL/L — SIGNIFICANT CHANGE UP (ref 3.5–5.3)
RBC # BLD: 4.01 M/UL — LOW (ref 4.6–6.2)
RBC # FLD: 13.7 % — SIGNIFICANT CHANGE UP (ref 11–15.6)
SODIUM SERPL-SCNC: 140 MMOL/L — SIGNIFICANT CHANGE UP (ref 135–145)
WBC # BLD: 7.5 K/UL — SIGNIFICANT CHANGE UP (ref 4.8–10.8)
WBC # FLD AUTO: 7.5 K/UL — SIGNIFICANT CHANGE UP (ref 4.8–10.8)

## 2017-11-08 PROCEDURE — 93880 EXTRACRANIAL BILAT STUDY: CPT | Mod: 26

## 2017-11-08 PROCEDURE — 93306 TTE W/DOPPLER COMPLETE: CPT | Mod: 26

## 2017-11-08 PROCEDURE — 99233 SBSQ HOSP IP/OBS HIGH 50: CPT

## 2017-11-08 RX ORDER — LOSARTAN POTASSIUM 100 MG/1
100 TABLET, FILM COATED ORAL DAILY
Qty: 0 | Refills: 0 | Status: DISCONTINUED | OUTPATIENT
Start: 2017-11-08 | End: 2017-11-09

## 2017-11-08 RX ADMIN — Medication 50 MICROGRAM(S): at 06:17

## 2017-11-08 RX ADMIN — HEPARIN SODIUM 5000 UNIT(S): 5000 INJECTION INTRAVENOUS; SUBCUTANEOUS at 12:23

## 2017-11-08 RX ADMIN — Medication 300 MILLIGRAM(S): at 12:23

## 2017-11-08 RX ADMIN — HEPARIN SODIUM 5000 UNIT(S): 5000 INJECTION INTRAVENOUS; SUBCUTANEOUS at 20:55

## 2017-11-08 RX ADMIN — CLOPIDOGREL BISULFATE 75 MILLIGRAM(S): 75 TABLET, FILM COATED ORAL at 12:23

## 2017-11-08 RX ADMIN — LOSARTAN POTASSIUM 100 MILLIGRAM(S): 100 TABLET, FILM COATED ORAL at 20:55

## 2017-11-08 RX ADMIN — Medication 80 MILLIGRAM(S): at 06:17

## 2017-11-08 RX ADMIN — ATORVASTATIN CALCIUM 20 MILLIGRAM(S): 80 TABLET, FILM COATED ORAL at 20:55

## 2017-11-08 RX ADMIN — Medication 325 MILLIGRAM(S): at 12:23

## 2017-11-08 RX ADMIN — PANTOPRAZOLE SODIUM 40 MILLIGRAM(S): 20 TABLET, DELAYED RELEASE ORAL at 06:17

## 2017-11-08 RX ADMIN — Medication 80 MILLIGRAM(S): at 17:09

## 2017-11-08 NOTE — PROGRESS NOTE ADULT - SUBJECTIVE AND OBJECTIVE BOX
[facility] - [facility], THE HEART [facility]ENTER                                   [address] [place] [address]                                                      PHONE: [phone]                                                         FAX: [phone]  [email or web address]  ---------------------------------------------------------------------------------------------------------------------------------    Overnight events/patient complaints:  no events        PAST MEDICAL & SURGICAL HISTORY:  Alzheimer disease  CHF (congestive heart failure)  Pacemaker  Prostate CA  Essential hypertension  Chronic gout, unspecified cause, unspecified site  Atrial fibrillation, unspecified type  No significant past surgical history      No Known Allergies    MEDICATIONS  (STANDING):  atorvastatin 20 milliGRAM(s) Oral at bedtime  clopidogrel Tablet 75 milliGRAM(s) Oral daily  diltiazem    milliGRAM(s) Oral daily  heparin  Injectable 5000 Unit(s) SubCutaneous every 8 hours  levothyroxine 50 MICROGram(s) Oral daily  pantoprazole    Tablet 40 milliGRAM(s) Oral before breakfast  sotalol 80 milliGRAM(s) Oral every 12 hours    MEDICATIONS  (PRN):      Vital Signs Last 24 Hrs  T(C): 36.7 (08 Nov 2017 06:11), Max: 37 (07 Nov 2017 16:16)  T(F): 98 (08 Nov 2017 06:11), Max: 98.6 (07 Nov 2017 16:16)  HR: 62 (08 Nov 2017 06:11) (59 - 62)  BP: 138/70 (08 Nov 2017 06:11) (118/64 - 178/80)  BP(mean): --  RR: 18 (08 Nov 2017 06:11) (18 - 20)  SpO2: 97% (08 Nov 2017 06:11) (96% - 99%)  ICU Vital Signs Last 24 Hrs  ALEKSANDAR HOWARD  I&O's Detail    07 Nov 2017 07:01  -  08 Nov 2017 07:00  --------------------------------------------------------  IN:  Total IN: 0 mL    OUT:    Voided: 800 mL  Total OUT: 800 mL    Total NET: -800 mL        I&O's Summary    07 Nov 2017 07:01  -  08 Nov 2017 07:00  --------------------------------------------------------  IN: 0 mL / OUT: 800 mL / NET: -800 mL      Drug Dosing Weight  ALEKSANDAR HOWARD      PHYSICAL EXAM:  General: Appears well developed, well nourished alert and cooperative.  HEENT: Head; normocephalic, atraumatic.  Eyes: Pupils reactive, cornea wnl.  Neck: Supple, no nodes adenopathy, no NVD or carotid bruit or thyromegaly.  CARDIOVASCULAR: Normal S1 and S2, No murmur, rub, gallop or lift.   LUNGS: No rales, rhonchi or wheeze. Normal breath sounds bilaterally.  ABDOMEN: Soft, nontender without mass or organomegaly. bowel sounds normoactive.  EXTREMITIES: No clubbing, cyanosis or edema. Distal pulses wnl.   SKIN: warm and dry with normal turgor.  NEURO: Alert/oriented x 3/normal motor exam. No pathologic reflexes.    PSYCH: normal affect.        LABS:                        13.2   7.5   )-----------( 70       ( 08 Nov 2017 06:25 )             38.7     11-08    140  |  103  |  25.0<H>  ----------------------------<  93  3.8   |  27.0  |  1.35<H>    Ca    8.7      08 Nov 2017 06:25    TPro  6.4<L>  /  Alb  3.6  /  TBili  1.4  /  DBili  x   /  AST  24  /  ALT  19  /  AlkPhos  119  11-07    ALEKSANDAR HOWARD  CARDIAC MARKERS ( 07 Nov 2017 18:34 )  x     / <0.01 ng/mL / x     / x     / x      CARDIAC MARKERS ( 07 Nov 2017 08:14 )  x     / <0.01 ng/mL / x     / x     / x          PT/INR - ( 07 Nov 2017 08:14 )   PT: 11.6 sec;   INR: 1.05 ratio         PTT - ( 07 Nov 2017 08:14 )  PTT:32.4 sec      RADIOLOGY & ADDITIONAL STUDIES:    INTERPRETATION OF TELEMETRY (personally reviewed):  Assessment and Plan:  In summary, ALEKSANDAR HOWARD is an 76y Male PMHX HTN, HLD, CHF, Afib s.p watchman device, PPM  CAD s/p PCI with total 7 JACQUI, who presented to [facility] ED complaining of near syncope.  Pt states that this am he was sitting at hisdesk when he became lightheaded and dizzy.  Pt follows with [facility].      -tele monitoring  -Device interrogation-no events -working appropriately  -echo -pending   -med rec from [facility]  -if echo unremarkable then pt can be d/c'd home  -no further cardiac interventions warranted at this time.     Thank you for allowing [facility] to participate in the care of this patient.  Please feel free to call with any questions or concerns.

## 2017-11-08 NOTE — PROGRESS NOTE ADULT - SUBJECTIVE AND OBJECTIVE BOX
Patient: ALEKSANDAR HOWARD [id number] 76y Male                 Internal Medicine Hospitalist Progress Note - Dr. Cindy Bonds MD    Chief Complaint: Patient is a 76y old  Male who presents with a chief complaint of Presyncope, SOB (07 Nov 2017 15:37). currently denied complaints    HPI:  77 y/o male with history of Afib s/p watchman 3 months ago not on AC any more per patient, SSS s/p PPM, CAD s/p 7stents, HTN, HLD, Gout, ankylosing spondylosis, Hypothyroidism, remote history of colon cancer s/p surgery/chemo 6 years ago,  a/w presyncope. CT negative. carotid doppler did ot show significant stenosis. PM interrogation did not show any abnormality. Echo did not reveal any cause for syncope. patient is asymptomatic. seen by cardiology this am. plan was dc if echo normal. however patient was found to have uncontrolled HTN, 170-190 mm of hg. discahrge was held. He was on dilteazem  mg and losartan 100 mg at home for BP controlled. patient was not sure about losartan and it was not started on admission. I spoke to Ms. Forman [ NP] at [facility] for . patient is on following meds:  ASA 81, plavix 75, sotalol 80 mg biD, prevastatin 40, levothyroxin 50 mcg, allopurinol 300 mg along with diltiazem and losartan. Maximo was dced on april.     ROS:  CONSTITUTIONAL:  No distress.no fever/chills/fatigue  HEENT:  Eyes:  No diplopia . + blurred vision without glasses  CARDIOVASCULAR:  No pressure, squeezing, tightness, heaviness or aching about the chest; no palpitations. no leg swelling, no orthopnea or PND  RESPIRATORY:  no SOB. no wheezing.no cough or sputum.  No hemoptysis  GI: no nausea, no vomiting, no diarrhea, no constipation. No hematochezia or melena  EXT:No joint pain or joint swelling or redness  SKIN: no skin break or ulcer. no rash  CNS: No headaches. No weakness. no numbness. no speech problem.    ____________________PHYSICAL EXAM:  GENERAL: Not in distress. Alert    HEENT:  Normocephalic and atraumatic. PEARLA,EOMI  NECK: Supple.  No JVD.    CARDIOVASCULAR: RRR S1, S2. No murmur/rubs/gallop  LUNGS: BLAE+, no rales, no wheezing, no rhonchi.    ABDOMEN: ND. Soft,  NT, no guarding / rebound / rigidity. BS normoactive. No CVA tenderness.    BACK: No spine tenderness.  EXTREMITIES: no cyanosis, no clubbing, no edema.   SKIN: no rash. No skin break or ulcer. No cellulitis.  NEUROLOGIC: AAO*3.strength is symmetric, sensation intact, speech fluent.  reflex 2+. babiski downgoing.   PSYCHIATRIC: Calm.  No agitation.    ____________________    BACKGROUND:  HEALTH ISSUES - PROBLEM Dx:        Allergies    No Known Allergies    Intolerances      PAST MEDICAL & SURGICAL HISTORY:  Alzheimer disease  CHF (congestive heart failure)  Pacemaker  Prostate CA  Essential hypertension  Chronic gout, unspecified cause, unspecified site  Atrial fibrillation, unspecified type  No significant past surgical history    Vital Signs Last 24 Hrs  T(C): 36.8 (08 Nov 2017 16:49), Max: 36.8 (08 Nov 2017 16:49)  T(F): 98.3 (08 Nov 2017 16:49), Max: 98.3 (08 Nov 2017 16:49)  HR: 82 (08 Nov 2017 16:49) (60 - 82)  BP: 190/90 (08 Nov 2017 18:33) (118/64 - 190/90)  BP(mean): --  RR: 18 (08 Nov 2017 16:49) (18 - 18)  SpO2: 100% (08 Nov 2017 12:19) (96% - 100%)      I&O's Summary    07 Nov 2017 07:01  -  08 Nov 2017 07:00  --------------------------------------------------------  IN: 0 mL / OUT: 800 mL / NET: -800 mL    08 Nov 2017 07:01  -  08 Nov 2017 18:44  --------------------------------------------------------  IN: 480 mL / OUT: 575 mL / NET: -95 mL        LABS:                        13.2   7.5   )-----------( 70       ( 08 Nov 2017 06:25 )             38.7     11-08    140  |  103  |  25.0<H>  ----------------------------<  93  3.8   |  27.0  |  1.35<H>    Ca    8.7      08 Nov 2017 06:25    TPro  6.4<L>  /  Alb  3.6  /  TBili  1.4  /  DBili  x   /  AST  24  /  ALT  19  /  AlkPhos  119  11-07    PT/INR - ( 07 Nov 2017 08:14 )   PT: 11.6 sec;   INR: 1.05 ratio         PTT - ( 07 Nov 2017 08:14 )  PTT:32.4 sec  LIVER FUNCTIONS - ( 07 Nov 2017 08:14 )  Alb: 3.6 g/dL / Pro: 6.4 g/dL / ALK PHOS: 119 U/L / ALT: 19 U/L / AST: 24 U/L / GGT: x             CARDIAC MARKERS ( 07 Nov 2017 18:34 )  x     / <0.01 ng/mL / x     / x     / x      CARDIAC MARKERS ( 07 Nov 2017 08:14 )  x     / <0.01 ng/mL / x     / x     / x            MEDICATIONS:  MEDICATIONS  (STANDING):  atorvastatin 20 milliGRAM(s) Oral at bedtime  clopidogrel Tablet 75 milliGRAM(s) Oral daily  diltiazem    milliGRAM(s) Oral daily  heparin  Injectable 5000 Unit(s) SubCutaneous every 8 hours  levothyroxine 50 MICROGram(s) Oral daily  losartan 100 milliGRAM(s) Oral daily  pantoprazole    Tablet 40 milliGRAM(s) Oral before breakfast  sotalol 80 milliGRAM(s) Oral every 12 hours    MEDICATIONS  (PRN): Patient: ALEKSANDAR HOWARD [id number] 76y Male                 Internal Medicine Hospitalist Progress Note - Dr. Cindy Bonds MD    Chief Complaint: Patient is a 76y old  Male who presents with a chief complaint of Presyncope, SOB (07 Nov 2017 15:37). currently denied complaints    HPI:  77 y/o male with history of Afib s/p watchman 3 months ago not on AC any more per patient, SSS s/p PPM, CAD s/p 7stents, HTN, HLD, Gout, ankylosing spondylosis, Hypothyroidism, remote history of colon cancer s/p surgery/chemo 6 years ago,  a/w presyncope. CT negative. carotid doppler did ot show significant stenosis. PM interrogation did not show any abnormality. Echo did not reveal any cause for syncope. EF 45-50%. patient is asymptomatic. seen by cardiology this am. plan was dc if echo normal. however patient was found to have uncontrolled HTN, 170-190 mm of hg. discahrge was held. He was on dilteazem  mg and losartan 100 mg at home for BP controlled. patient was not sure about losartan and it was not started on admission. I spoke to Ms. Forman [ NP] at [facility] for . patient is on following meds:  ASA 81, plavix 75, sotalol 80 mg biD, prevastatin 40, levothyroxin 50 mcg, allopurinol 300 mg along with diltiazem and losartan. Maximo was dced on april.     ROS:  CONSTITUTIONAL:  No distress.no fever/chills/fatigue  HEENT:  Eyes:  No diplopia . + blurred vision without glasses  CARDIOVASCULAR:  No pressure, squeezing, tightness, heaviness or aching about the chest; no palpitations. no leg swelling, no orthopnea or PND  RESPIRATORY:  no SOB. no wheezing.no cough or sputum.  No hemoptysis  GI: no nausea, no vomiting, no diarrhea, no constipation. No hematochezia or melena  EXT:No joint pain or joint swelling or redness  SKIN: no skin break or ulcer. no rash  CNS: No headaches. No weakness. no numbness. no speech problem.    ____________________PHYSICAL EXAM:  GENERAL: Not in distress. Alert    HEENT:  Normocephalic and atraumatic. PEARLA,EOMI  NECK: Supple.  No JVD.    CARDIOVASCULAR: RRR S1, S2. No murmur/rubs/gallop  LUNGS: BLAE+, no rales, no wheezing, no rhonchi.    ABDOMEN: ND. Soft,  NT, no guarding / rebound / rigidity. BS normoactive. No CVA tenderness.    BACK: No spine tenderness.  EXTREMITIES: no cyanosis, no clubbing, no edema.   SKIN: no rash. No skin break or ulcer. No cellulitis.  NEUROLOGIC: AAO*3.strength is symmetric, sensation intact, speech fluent.  reflex 2+. babiski downgoing.   PSYCHIATRIC: Calm.  No agitation.    ____________________    BACKGROUND:  HEALTH ISSUES - PROBLEM Dx:        Allergies    No Known Allergies    Intolerances      PAST MEDICAL & SURGICAL HISTORY:  Alzheimer disease  CHF (congestive heart failure)  Pacemaker  Prostate CA  Essential hypertension  Chronic gout, unspecified cause, unspecified site  Atrial fibrillation, unspecified type  No significant past surgical history    Vital Signs Last 24 Hrs  T(C): 36.8 (08 Nov 2017 16:49), Max: 36.8 (08 Nov 2017 16:49)  T(F): 98.3 (08 Nov 2017 16:49), Max: 98.3 (08 Nov 2017 16:49)  HR: 82 (08 Nov 2017 16:49) (60 - 82)  BP: 190/90 (08 Nov 2017 18:33) (118/64 - 190/90)  BP(mean): --  RR: 18 (08 Nov 2017 16:49) (18 - 18)  SpO2: 100% (08 Nov 2017 12:19) (96% - 100%)      I&O's Summary    07 Nov 2017 07:01  -  08 Nov 2017 07:00  --------------------------------------------------------  IN: 0 mL / OUT: 800 mL / NET: -800 mL    08 Nov 2017 07:01  -  08 Nov 2017 18:44  --------------------------------------------------------  IN: 480 mL / OUT: 575 mL / NET: -95 mL        LABS:                        13.2   7.5   )-----------( 70       ( 08 Nov 2017 06:25 )             38.7     11-08    140  |  103  |  25.0<H>  ----------------------------<  93  3.8   |  27.0  |  1.35<H>    Ca    8.7      08 Nov 2017 06:25    TPro  6.4<L>  /  Alb  3.6  /  TBili  1.4  /  DBili  x   /  AST  24  /  ALT  19  /  AlkPhos  119  11-07    PT/INR - ( 07 Nov 2017 08:14 )   PT: 11.6 sec;   INR: 1.05 ratio         PTT - ( 07 Nov 2017 08:14 )  PTT:32.4 sec  LIVER FUNCTIONS - ( 07 Nov 2017 08:14 )  Alb: 3.6 g/dL / Pro: 6.4 g/dL / ALK PHOS: 119 U/L / ALT: 19 U/L / AST: 24 U/L / GGT: x             CARDIAC MARKERS ( 07 Nov 2017 18:34 )  x     / <0.01 ng/mL / x     / x     / x      CARDIAC MARKERS ( 07 Nov 2017 08:14 )  x     / <0.01 ng/mL / x     / x     / x            MEDICATIONS:  MEDICATIONS  (STANDING):  atorvastatin 20 milliGRAM(s) Oral at bedtime  clopidogrel Tablet 75 milliGRAM(s) Oral daily  diltiazem    milliGRAM(s) Oral daily  heparin  Injectable 5000 Unit(s) SubCutaneous every 8 hours  levothyroxine 50 MICROGram(s) Oral daily  losartan 100 milliGRAM(s) Oral daily  pantoprazole    Tablet 40 milliGRAM(s) Oral before breakfast  sotalol 80 milliGRAM(s) Oral every 12 hours    MEDICATIONS  (PRN):

## 2017-11-08 NOTE — PROGRESS NOTE ADULT - ASSESSMENT
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 Pt denied LOC, fall, head injury, tongue bite, bowel/bladder incontinence. In ED patient note to have high pro-BNP, CXR did not show congestion. Pacemaker interrogated in ED and cardiology consulted. Pt admitted to medicine for further work up and management. Presyncope could be related to underlying CMP, EF unknown.    Plan:    Presyncope with brief SOB:  - Pt stable now, no more SOB. Unclear etiology. arrythmia and neurological cause has been ruled out.  - Cardiology consult appreciated.  - fall precaution  - 's office was informed. patient to call for appointment in 1 week.     Uncontrolled HTN:   - Losartan 100 mg stat and recheck BP and inform MD if above 180/90 mm of hg  - c/w dilteazem  - DASh diet  - will DC if BP below 180/110  - f/u PCP in 1 week    CVS: hx of Afib s/p watchman CAD s/p 7 stents, SSS s/p PPM, questionable CHF:  - Pt with no chest pain or sob for now, euvolemic on exam.  - Echo reviewed. EF normal  - c/w home meds. Pt is not on Eliquis any more s/p watchman.     HTN / HLD/ Hypothyroidism / Gout:  - c/w home medication    Hx of colon cancer / Spleen cancer:  - Outpatient follow up with his oncologist.     CKD: stable  - Monitor renal function. Avoid nephrotoxic meds  - CMP in 3 days . losartan restarted    DVT ppx:  - heparin subc  - no bleeding    plan discussed with JEYSON garcia. 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 Pt denied LOC, fall, head injury, tongue bite, bowel/bladder incontinence. In ED patient note to have high pro-BNP, CXR did not show congestion. Pacemaker interrogated in ED and cardiology consulted. Pt admitted to medicine for further work up and management. Presyncope could be related to underlying CMP, EF unknown.    Plan:    Presyncope with brief SOB:  - Pt stable now, no more SOB. Unclear etiology. arrythmia and neurological cause has been ruled out.  - Cardiology consult appreciated.  - fall precaution  - 's office was informed. patient to call for appointment in 1 week.     Uncontrolled HTN:   - Losartan 100 mg stat and recheck BP and inform MD if above 180/90 mm of hg  - c/w dilteazem  - DASh diet  - will DC if BP below 180/110  - f/u PCP in 1 week    CVS: hx of Afib s/p watchman CAD s/p 7 stents, SSS s/p PPM, questionable CHF:  - Pt with no chest pain or sob for now, euvolemic on exam.  - Echo reviewed. EF normal  - c/w home meds. Pt is not on Eliquis any more s/p watchman.     HTN / HLD/ Hypothyroidism / Gout:  - c/w home medication    Hx of colon cancer / Spleen cancer:  - Outpatient follow up with his oncologist.     CKD: stable  - Monitor renal function. Avoid nephrotoxic meds  - CMP in 3 days . losartan restarted    DVT ppx:  - heparin subc  - no bleeding    plan discussed with JEYSON garcia and  [cardiology]. 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 Pt denied LOC, fall, head injury, tongue bite, bowel/bladder incontinence. In ED patient note to have high pro-BNP, CXR did not show congestion. Pacemaker interrogated in ED and cardiology consulted. Pt admitted to medicine for further work up and management. Presyncope could be related to underlying CMP, EF unknown.    Plan:    Presyncope with brief SOB:  - Pt stable now, no more SOB. Unclear etiology. arrythmia and neurological cause has been ruled out.  - Cardiology consult appreciated.  - fall precaution  - 's office was informed. patient to call for appointment in 1 week.     Uncontrolled HTN:   - Losartan 100 mg stat and recheck BP and inform MD if above 180/90 mm of hg  - c/w dilteazem  - DASh diet  - will DC if BP below 180/110  - f/u PCP in 1 week    CVS: hx of Afib s/p watchman CAD s/p 7 stents, SSS s/p PPM, questionable CHF:  - Pt with no chest pain or sob for now, euvolemic on exam.  - Echo reviewed. EF normal  - c/w home meds. Pt is not on Eliquis any more s/p watchman.     HTN / HLD/ Hypothyroidism / Gout:  - c/w home medication    Hx of colon cancer / Spleen cancer:  - Outpatient follow up with his oncologist.     Thrombocytopenia possibly chronic, related to malignancy and chemo. no ssx of bleeding    CKD: stable  - Monitor renal function. Avoid nephrotoxic meds  - CMP in 3 days . losartan restarted    DVT ppx:  - heparin subc  - no bleeding    plan discussed with JEYSON garcia and  [cardiology].

## 2017-11-09 ENCOUNTER — TRANSCRIPTION ENCOUNTER (OUTPATIENT)
Age: 77
End: 2017-11-09

## 2017-11-09 VITALS — SYSTOLIC BLOOD PRESSURE: 150 MMHG | HEART RATE: 98 BPM | DIASTOLIC BLOOD PRESSURE: 70 MMHG

## 2017-11-09 PROCEDURE — 99238 HOSP IP/OBS DSCHRG MGMT 30/<: CPT

## 2017-11-09 RX ORDER — LOSARTAN POTASSIUM 100 MG/1
1 TABLET, FILM COATED ORAL
Qty: 0 | Refills: 0 | DISCHARGE
Start: 2017-11-09

## 2017-11-09 RX ADMIN — Medication 300 MILLIGRAM(S): at 10:19

## 2017-11-09 RX ADMIN — PANTOPRAZOLE SODIUM 40 MILLIGRAM(S): 20 TABLET, DELAYED RELEASE ORAL at 06:13

## 2017-11-09 RX ADMIN — Medication 80 MILLIGRAM(S): at 06:13

## 2017-11-09 RX ADMIN — Medication 325 MILLIGRAM(S): at 10:25

## 2017-11-09 RX ADMIN — LOSARTAN POTASSIUM 100 MILLIGRAM(S): 100 TABLET, FILM COATED ORAL at 06:33

## 2017-11-09 RX ADMIN — CLOPIDOGREL BISULFATE 75 MILLIGRAM(S): 75 TABLET, FILM COATED ORAL at 10:25

## 2017-11-09 RX ADMIN — Medication 50 MICROGRAM(S): at 06:13

## 2017-11-09 NOTE — DISCHARGE NOTE ADULT - PATIENT PORTAL LINK FT
“You can access the [employer] Patient Portal, offered by [facility], by registering with the following website: [email or web address]”

## 2017-11-09 NOTE — DISCHARGE NOTE ADULT - SECONDARY DIAGNOSIS.
Essential hypertension CHF (congestive heart failure) Atrial fibrillation, unspecified type Pacemaker

## 2017-11-09 NOTE — DISCHARGE NOTE ADULT - PLAN OF CARE
prevent further episode. r/o cardiovascular and neurologic etiology continue meds as prescribed. fall precaution. follow up with primary MD in 1 week and cardiology in 1-2 weeks. please call for appoinment c/w meds. f/u cardiology. restrict sodium no more than 2 gm in 24 hrs. daily weight.  go to ER if any SOB/chest pain or any other severe concerning symptoms. c/w meds. f/u cardiology. seek immediate medical attention if palpitation, lightheadedness, Chest pain/SOB /severe headaches/limb weaknesses/ speech problem or unusual vision or any concerning symptoms. c/w meds. f/u cardiology continue meds as prescribed. fall precaution. follow up with primary MD Dr. Chatman in 1 week and cardiology in 1-2 weeks. please call for appointment c/w meds. f/u cardiology. restrict sodium no more than 2 gm in 24 hrs. daily weight.  go to ER if any SOB/chest pain or any other severe concerning symptoms. otherwise call cardiology/PMD

## 2017-11-09 NOTE — DISCHARGE NOTE ADULT - CARE PLAN
Principal Discharge DX:	Pre-syncope  Goal:	prevent further episode. r/o cardiovascular and neurologic etiology  Instructions for follow-up, activity and diet:	continue meds as prescribed. fall precaution. follow up with primary MD in 1 week and cardiology in 1-2 weeks. please call for appoinment  Secondary Diagnosis:	Essential hypertension  Secondary Diagnosis:	CHF (congestive heart failure)  Instructions for follow-up, activity and diet:	c/w meds. f/u cardiology. restrict sodium no more than 2 gm in 24 hrs. daily weight.  go to ER if any SOB/chest pain or any other severe concerning symptoms.  Secondary Diagnosis:	Atrial fibrillation, unspecified type  Instructions for follow-up, activity and diet:	c/w meds. f/u cardiology. seek immediate medical attention if palpitation, lightheadedness, Chest pain/SOB /severe headaches/limb weaknesses/ speech problem or unusual vision or any concerning symptoms.  Secondary Diagnosis:	Pacemaker  Goal:	c/w meds. f/u cardiology Principal Discharge DX:	Pre-syncope  Goal:	prevent further episode. r/o cardiovascular and neurologic etiology  Instructions for follow-up, activity and diet:	continue meds as prescribed. fall precaution. follow up with primary MD Dr. Chatman in 1 week and cardiology in 1-2 weeks. please call for appointment  Secondary Diagnosis:	Essential hypertension  Secondary Diagnosis:	CHF (congestive heart failure)  Instructions for follow-up, activity and diet:	c/w meds. f/u cardiology. restrict sodium no more than 2 gm in 24 hrs. daily weight.  go to ER if any SOB/chest pain or any other severe concerning symptoms. otherwise call cardiology/PMD  Secondary Diagnosis:	Atrial fibrillation, unspecified type  Instructions for follow-up, activity and diet:	c/w meds. f/u cardiology. seek immediate medical attention if palpitation, lightheadedness, Chest pain/SOB /severe headaches/limb weaknesses/ speech problem or unusual vision or any concerning symptoms.  Secondary Diagnosis:	Pacemaker  Goal:	c/w meds. f/u cardiology

## 2017-11-09 NOTE — DISCHARGE NOTE ADULT - HOSPITAL COURSE
77 y/o male with history of Afib s/p watchman 3 months ago not on AC any more per patient, SSS s/p PPM, CAD s/p 7stents, HTN, HLD, Gout, ankylosing spondylosis, Hypothyroidism, remote history of colon cancer s/p surgery/chemo 6 years ago,  a/w presyncope. CT negative. carotid doppler did not show significant stenosis. PM interrogation did not show any abnormality. Echo did not reveal any cause for syncope. EF 45-50%. patient is asymptomatic. seen by cardiology. plan was dc if echo normal. however patient was found to have uncontrolled HTN, 170-190 mm of hg. discahrge was held. He was on dilteazem  mg and losartan 100 mg at home for BP controlled. patient was not sure about losartan and it was not started on admission. I spoke to Ms. Forman [ NP] at [facility] for . patient is on following meds:  ASA 81, plavix 75, sotalol 80 mg biD, prevastatin 40, levothyroxin 50 mcg, allopurinol 300 mg along with diltiazem and losartan. Maximo was dced on april. BP is currently better controlled after restarting losartan. patient needs to follow up with PCP in 1 week and cardiology in 1-2 weeks. 77 y/o male with history of Afib s/p watchman 3 months ago not on AC any more per patient, SSS s/p PPM, CAD s/p 7stents, HTN, HLD, Gout, ankylosing spondylosis, Hypothyroidism, remote history of colon cancer s/p surgery/chemo 6 years ago,  a/w presyncope. CT negative. carotid doppler did not show significant stenosis. PM interrogation did not show any abnormality. Echo did not reveal any cause for syncope. EF 45-50%. patient is asymptomatic. seen by cardiology. plan was dc if echo normal. however patient was found to have uncontrolled HTN, 170-190 mm of hg. discahrge was held. He was on dilteazem  mg and losartan 100 mg at home for BP controlled. patient was not sure about losartan and it was not started on admission. I spoke to Ms. Forman [ NP] at [facility] for . patient is on following meds:  ASA 81, plavix 75, sotalol 80 mg biD, prevastatin 40, levothyroxin 50 mcg, allopurinol 300 mg along with diltiazem and losartan. Maximo was dced on april. BP is currently better controlled after restarting losartan. examination did not show significant abnormality. patient needs to follow up with PCP in 1 week and cardiology in 1-2 weeks.

## 2017-11-09 NOTE — DISCHARGE NOTE ADULT - CARE PROVIDER_API CALL
Yimi Blackwood (MD), Cardiovascular Disease  [address] [place] [address]  Phone: [phone]  Fax: [phone]

## 2017-11-09 NOTE — DISCHARGE NOTE ADULT - MEDICATION SUMMARY - MEDICATIONS TO TAKE
I will START or STAY ON the medications listed below when I get home from the hospital:    aspirin 325 mg oral tablet  -- 1 tab(s) by mouth once a day  -- Indication: For Atrial fibrillation, unspecified type    losartan 100 mg oral tablet  -- 1 tab(s) by mouth once a day  -- Indication: For Essential hypertension    tamsulosin 0.4 mg oral capsule  -- 1 cap(s) by mouth once a day (at bedtime)  -- Indication: For BPH    tamsulosin 0.4 mg oral capsule  -- 1 cap(s) by mouth once a day (at bedtime)  -- Indication: For bph    sotalol 80 mg oral tablet  -- 1 tab(s) by mouth 2 times a day  -- Indication: For Atrial fibrillation, unspecified type    dilTIAZem 180 mg/24 hours oral capsule, extended release  -- 1 cap(s) by mouth once a day  -- Indication: For Atrial fibrillation, unspecified type    allopurinol 300 mg oral tablet  -- 1 tab(s) by mouth once a day  -- Indication: For gout    Lipitor 20 mg oral tablet  -- 1 tab(s) by mouth once a day  -- Indication: For hyperlipidemia    Plavix 75 mg oral tablet  -- 1 tab(s) by mouth once a day  -- Indication: For Atrial fibrillation, unspecified type    Thyroxine Sodium Pentahydrate  -- 50 milligram(s)    -- Indication: For hypothyroid

## 2017-11-09 NOTE — DISCHARGE NOTE ADULT - MEDICATION SUMMARY - MEDICATIONS TO STOP TAKING
I will STOP taking the medications listed below when I get home from the hospital:    Eliquis 5 mg oral tablet  -- 1 tab(s) by mouth 2 times a day    linezolid 600 mg oral tablet  -- 1 tab(s) by mouth every 12 hours

## 2017-12-19 PROCEDURE — 85379 FIBRIN DEGRADATION QUANT: CPT

## 2017-12-19 PROCEDURE — 93005 ELECTROCARDIOGRAM TRACING: CPT

## 2017-12-19 PROCEDURE — 85610 PROTHROMBIN TIME: CPT

## 2017-12-19 PROCEDURE — 85027 COMPLETE CBC AUTOMATED: CPT

## 2017-12-19 PROCEDURE — 99285 EMERGENCY DEPT VISIT HI MDM: CPT | Mod: 25

## 2017-12-19 PROCEDURE — 80053 COMPREHEN METABOLIC PANEL: CPT

## 2017-12-19 PROCEDURE — 36415 COLL VENOUS BLD VENIPUNCTURE: CPT

## 2017-12-19 PROCEDURE — 82962 GLUCOSE BLOOD TEST: CPT

## 2017-12-19 PROCEDURE — 83880 ASSAY OF NATRIURETIC PEPTIDE: CPT

## 2017-12-19 PROCEDURE — 93306 TTE W/DOPPLER COMPLETE: CPT

## 2017-12-19 PROCEDURE — 93880 EXTRACRANIAL BILAT STUDY: CPT

## 2017-12-19 PROCEDURE — 70450 CT HEAD/BRAIN W/O DYE: CPT

## 2017-12-19 PROCEDURE — 80048 BASIC METABOLIC PNL TOTAL CA: CPT

## 2017-12-19 PROCEDURE — 84484 ASSAY OF TROPONIN QUANT: CPT

## 2017-12-19 PROCEDURE — 85730 THROMBOPLASTIN TIME PARTIAL: CPT

## 2017-12-19 PROCEDURE — 71045 X-RAY EXAM CHEST 1 VIEW: CPT

## 2017-12-19 PROCEDURE — 84443 ASSAY THYROID STIM HORMONE: CPT

## 2019-07-08 NOTE — ED PROVIDER NOTE - NS ED MD DISPO ISOLATION TYPES
86 y.o F w/ a pmh of breast cancer and hypertension who transferred from [facility] after having a mechanical fall and falling on her outstretched hands in her house in the [facility] on 7/5. Pt does not have imaging done with her from [facility], will order x-ray b/l wrist and prep for hand surgery tomorrow
None

## 2019-09-19 NOTE — DISCHARGE NOTE ADULT - NS MD DC FALL RISK RISK
Traveling alone
For information on Fall & Injury Prevention, visit [email or web address]

## 2020-01-07 ENCOUNTER — INPATIENT (INPATIENT)
Facility: HOSPITAL | Age: 80
LOS: 5 days | Discharge: NURSING, REHAB OR LONG-TERM CARE | DRG: 689 | End: 2020-01-13
Attending: INTERNAL MEDICINE | Admitting: INTERNAL MEDICINE
Payer: MEDICARE

## 2020-01-07 VITALS
TEMPERATURE: 98 F | RESPIRATION RATE: 20 BRPM | DIASTOLIC BLOOD PRESSURE: 85 MMHG | SYSTOLIC BLOOD PRESSURE: 143 MMHG | OXYGEN SATURATION: 98 % | WEIGHT: 147.93 LBS | HEIGHT: 70 IN | HEART RATE: 100 BPM

## 2020-01-07 DIAGNOSIS — N39.0 URINARY TRACT INFECTION, SITE NOT SPECIFIED: ICD-10-CM

## 2020-01-07 DIAGNOSIS — C18.9 MALIGNANT NEOPLASM OF COLON, UNSPECIFIED: Chronic | ICD-10-CM

## 2020-01-07 PROBLEM — Z95.0 PRESENCE OF CARDIAC PACEMAKER: Chronic | Status: ACTIVE | Noted: 2017-05-16

## 2020-01-07 PROBLEM — M1A.9XX0 CHRONIC GOUT, UNSPECIFIED, WITHOUT TOPHUS (TOPHI): Chronic | Status: ACTIVE | Noted: 2017-05-05

## 2020-01-07 PROBLEM — C61 MALIGNANT NEOPLASM OF PROSTATE: Chronic | Status: ACTIVE | Noted: 2017-05-16

## 2020-01-07 PROBLEM — G30.9 ALZHEIMER'S DISEASE, UNSPECIFIED: Chronic | Status: ACTIVE | Noted: 2017-11-07

## 2020-01-07 PROBLEM — I48.91 UNSPECIFIED ATRIAL FIBRILLATION: Chronic | Status: ACTIVE | Noted: 2017-05-05

## 2020-01-07 PROBLEM — I10 ESSENTIAL (PRIMARY) HYPERTENSION: Chronic | Status: ACTIVE | Noted: 2017-05-05

## 2020-01-07 PROBLEM — I50.9 HEART FAILURE, UNSPECIFIED: Chronic | Status: ACTIVE | Noted: 2017-11-07

## 2020-01-07 LAB
ALBUMIN SERPL ELPH-MCNC: 3.9 G/DL — SIGNIFICANT CHANGE UP (ref 3.3–5.2)
ALP SERPL-CCNC: 137 U/L — HIGH (ref 40–120)
ALT FLD-CCNC: 11 U/L — SIGNIFICANT CHANGE UP
ANION GAP SERPL CALC-SCNC: 14 MMOL/L — SIGNIFICANT CHANGE UP (ref 5–17)
ANISOCYTOSIS BLD QL: SLIGHT — SIGNIFICANT CHANGE UP
APPEARANCE UR: ABNORMAL
AST SERPL-CCNC: 21 U/L — SIGNIFICANT CHANGE UP
BACTERIA # UR AUTO: ABNORMAL
BASOPHILS # BLD AUTO: 0.08 K/UL — SIGNIFICANT CHANGE UP (ref 0–0.2)
BASOPHILS NFR BLD AUTO: 0.9 % — SIGNIFICANT CHANGE UP (ref 0–2)
BILIRUB SERPL-MCNC: 1.6 MG/DL — SIGNIFICANT CHANGE UP (ref 0.4–2)
BILIRUB UR-MCNC: NEGATIVE — SIGNIFICANT CHANGE UP
BUN SERPL-MCNC: 24 MG/DL — HIGH (ref 8–20)
CALCIUM SERPL-MCNC: 9.4 MG/DL — SIGNIFICANT CHANGE UP (ref 8.6–10.2)
CHLORIDE SERPL-SCNC: 103 MMOL/L — SIGNIFICANT CHANGE UP (ref 98–107)
CO2 SERPL-SCNC: 26 MMOL/L — SIGNIFICANT CHANGE UP (ref 22–29)
COLOR SPEC: YELLOW — SIGNIFICANT CHANGE UP
CREAT SERPL-MCNC: 1.55 MG/DL — HIGH (ref 0.5–1.3)
DIFF PNL FLD: ABNORMAL
EOSINOPHIL # BLD AUTO: 0.23 K/UL — SIGNIFICANT CHANGE UP (ref 0–0.5)
EOSINOPHIL NFR BLD AUTO: 2.6 % — SIGNIFICANT CHANGE UP (ref 0–6)
EPI CELLS # UR: NEGATIVE — SIGNIFICANT CHANGE UP
GIANT PLATELETS BLD QL SMEAR: PRESENT — SIGNIFICANT CHANGE UP
GLUCOSE SERPL-MCNC: 90 MG/DL — SIGNIFICANT CHANGE UP (ref 70–115)
GLUCOSE UR QL: NEGATIVE — SIGNIFICANT CHANGE UP
HCT VFR BLD CALC: 41.9 % — SIGNIFICANT CHANGE UP (ref 39–50)
HGB BLD-MCNC: 13.8 G/DL — SIGNIFICANT CHANGE UP (ref 13–17)
KETONES UR-MCNC: ABNORMAL
LACTATE BLDV-MCNC: 0.8 MMOL/L — SIGNIFICANT CHANGE UP (ref 0.5–2)
LEUKOCYTE ESTERASE UR-ACNC: ABNORMAL
LYMPHOCYTES # BLD AUTO: 3.13 K/UL — SIGNIFICANT CHANGE UP (ref 1–3.3)
LYMPHOCYTES # BLD AUTO: 35.6 % — SIGNIFICANT CHANGE UP (ref 13–44)
MACROCYTES BLD QL: SLIGHT — SIGNIFICANT CHANGE UP
MANUAL SMEAR VERIFICATION: SIGNIFICANT CHANGE UP
MCHC RBC-ENTMCNC: 32.9 GM/DL — SIGNIFICANT CHANGE UP (ref 32–36)
MCHC RBC-ENTMCNC: 33 PG — SIGNIFICANT CHANGE UP (ref 27–34)
MCV RBC AUTO: 100.2 FL — HIGH (ref 80–100)
MONOCYTES # BLD AUTO: 0.54 K/UL — SIGNIFICANT CHANGE UP (ref 0–0.9)
MONOCYTES NFR BLD AUTO: 6.1 % — SIGNIFICANT CHANGE UP (ref 2–14)
NEUTROPHILS # BLD AUTO: 4.36 K/UL — SIGNIFICANT CHANGE UP (ref 1.8–7.4)
NEUTROPHILS NFR BLD AUTO: 49.6 % — SIGNIFICANT CHANGE UP (ref 43–77)
NITRITE UR-MCNC: POSITIVE
PH UR: 6.5 — SIGNIFICANT CHANGE UP (ref 5–8)
PLAT MORPH BLD: ABNORMAL
PLATELET # BLD AUTO: 66 K/UL — LOW (ref 150–400)
PLATELET COUNT - ESTIMATE: ABNORMAL
POIKILOCYTOSIS BLD QL AUTO: SLIGHT — SIGNIFICANT CHANGE UP
POLYCHROMASIA BLD QL SMEAR: SLIGHT — SIGNIFICANT CHANGE UP
POTASSIUM SERPL-MCNC: 4.2 MMOL/L — SIGNIFICANT CHANGE UP (ref 3.5–5.3)
POTASSIUM SERPL-SCNC: 4.2 MMOL/L — SIGNIFICANT CHANGE UP (ref 3.5–5.3)
PROT SERPL-MCNC: 6.5 G/DL — LOW (ref 6.6–8.7)
PROT UR-MCNC: 30 MG/DL
RAPID RVP RESULT: SIGNIFICANT CHANGE UP
RBC # BLD: 4.18 M/UL — LOW (ref 4.2–5.8)
RBC # FLD: 13.2 % — SIGNIFICANT CHANGE UP (ref 10.3–14.5)
RBC BLD AUTO: ABNORMAL
RBC CASTS # UR COMP ASSIST: >50 /HPF (ref 0–4)
SODIUM SERPL-SCNC: 143 MMOL/L — SIGNIFICANT CHANGE UP (ref 135–145)
SP GR SPEC: 1.01 — SIGNIFICANT CHANGE UP (ref 1.01–1.02)
UROBILINOGEN FLD QL: 1
VARIANT LYMPHS # BLD: 5.2 % — SIGNIFICANT CHANGE UP (ref 0–6)
WBC # BLD: 8.79 K/UL — SIGNIFICANT CHANGE UP (ref 3.8–10.5)
WBC # FLD AUTO: 8.79 K/UL — SIGNIFICANT CHANGE UP (ref 3.8–10.5)
WBC UR QL: ABNORMAL

## 2020-01-07 PROCEDURE — 99285 EMERGENCY DEPT VISIT HI MDM: CPT

## 2020-01-07 PROCEDURE — 71045 X-RAY EXAM CHEST 1 VIEW: CPT | Mod: 26

## 2020-01-07 PROCEDURE — 99223 1ST HOSP IP/OBS HIGH 75: CPT | Mod: AI

## 2020-01-07 PROCEDURE — 93010 ELECTROCARDIOGRAM REPORT: CPT

## 2020-01-07 PROCEDURE — 70450 CT HEAD/BRAIN W/O DYE: CPT | Mod: 26

## 2020-01-07 RX ORDER — DULOXETINE HYDROCHLORIDE 30 MG/1
30 CAPSULE, DELAYED RELEASE ORAL DAILY
Refills: 0 | Status: DISCONTINUED | OUTPATIENT
Start: 2020-01-07 | End: 2020-01-13

## 2020-01-07 RX ORDER — CLOPIDOGREL BISULFATE 75 MG/1
75 TABLET, FILM COATED ORAL DAILY
Refills: 0 | Status: DISCONTINUED | OUTPATIENT
Start: 2020-01-07 | End: 2020-01-13

## 2020-01-07 RX ORDER — CEFTRIAXONE 500 MG/1
1000 INJECTION, POWDER, FOR SOLUTION INTRAMUSCULAR; INTRAVENOUS EVERY 24 HOURS
Refills: 0 | Status: COMPLETED | OUTPATIENT
Start: 2020-01-08 | End: 2020-01-12

## 2020-01-07 RX ORDER — ATORVASTATIN CALCIUM 80 MG/1
20 TABLET, FILM COATED ORAL AT BEDTIME
Refills: 0 | Status: DISCONTINUED | OUTPATIENT
Start: 2020-01-07 | End: 2020-01-13

## 2020-01-07 RX ORDER — ASPIRIN/CALCIUM CARB/MAGNESIUM 324 MG
81 TABLET ORAL DAILY
Refills: 0 | Status: DISCONTINUED | OUTPATIENT
Start: 2020-01-07 | End: 2020-01-13

## 2020-01-07 RX ORDER — HEPARIN SODIUM 5000 [USP'U]/ML
5000 INJECTION INTRAVENOUS; SUBCUTANEOUS EVERY 12 HOURS
Refills: 0 | Status: DISCONTINUED | OUTPATIENT
Start: 2020-01-07 | End: 2020-01-13

## 2020-01-07 RX ORDER — LEVOTHYROXINE SODIUM 125 MCG
50 TABLET ORAL DAILY
Refills: 0 | Status: DISCONTINUED | OUTPATIENT
Start: 2020-01-07 | End: 2020-01-13

## 2020-01-07 RX ORDER — CEFTRIAXONE 500 MG/1
1 INJECTION, POWDER, FOR SOLUTION INTRAMUSCULAR; INTRAVENOUS ONCE
Refills: 0 | Status: DISCONTINUED | OUTPATIENT
Start: 2020-01-07 | End: 2020-01-07

## 2020-01-07 RX ORDER — CEFTRIAXONE 500 MG/1
1000 INJECTION, POWDER, FOR SOLUTION INTRAMUSCULAR; INTRAVENOUS ONCE
Refills: 0 | Status: COMPLETED | OUTPATIENT
Start: 2020-01-07 | End: 2020-01-07

## 2020-01-07 RX ORDER — SOTALOL HCL 120 MG
80 TABLET ORAL
Refills: 0 | Status: DISCONTINUED | OUTPATIENT
Start: 2020-01-07 | End: 2020-01-13

## 2020-01-07 RX ORDER — DILTIAZEM HCL 120 MG
180 CAPSULE, EXT RELEASE 24 HR ORAL DAILY
Refills: 0 | Status: DISCONTINUED | OUTPATIENT
Start: 2020-01-07 | End: 2020-01-13

## 2020-01-07 RX ORDER — SODIUM CHLORIDE 9 MG/ML
1000 INJECTION INTRAMUSCULAR; INTRAVENOUS; SUBCUTANEOUS
Refills: 0 | Status: DISCONTINUED | OUTPATIENT
Start: 2020-01-07 | End: 2020-01-13

## 2020-01-07 RX ORDER — ALLOPURINOL 300 MG
300 TABLET ORAL DAILY
Refills: 0 | Status: DISCONTINUED | OUTPATIENT
Start: 2020-01-07 | End: 2020-01-13

## 2020-01-07 RX ORDER — TAMSULOSIN HYDROCHLORIDE 0.4 MG/1
0.4 CAPSULE ORAL AT BEDTIME
Refills: 0 | Status: DISCONTINUED | OUTPATIENT
Start: 2020-01-07 | End: 2020-01-13

## 2020-01-07 RX ORDER — SODIUM CHLORIDE 9 MG/ML
1000 INJECTION INTRAMUSCULAR; INTRAVENOUS; SUBCUTANEOUS ONCE
Refills: 0 | Status: COMPLETED | OUTPATIENT
Start: 2020-01-07 | End: 2020-01-07

## 2020-01-07 RX ADMIN — Medication 180 MILLIGRAM(S): at 17:27

## 2020-01-07 RX ADMIN — Medication 80 MILLIGRAM(S): at 17:27

## 2020-01-07 RX ADMIN — ATORVASTATIN CALCIUM 20 MILLIGRAM(S): 80 TABLET, FILM COATED ORAL at 22:11

## 2020-01-07 RX ADMIN — TAMSULOSIN HYDROCHLORIDE 0.4 MILLIGRAM(S): 0.4 CAPSULE ORAL at 22:11

## 2020-01-07 RX ADMIN — CEFTRIAXONE 100 MILLIGRAM(S): 500 INJECTION, POWDER, FOR SOLUTION INTRAMUSCULAR; INTRAVENOUS at 12:24

## 2020-01-07 RX ADMIN — SODIUM CHLORIDE 1000 MILLILITER(S): 9 INJECTION INTRAMUSCULAR; INTRAVENOUS; SUBCUTANEOUS at 09:04

## 2020-01-07 RX ADMIN — SODIUM CHLORIDE 75 MILLILITER(S): 9 INJECTION INTRAMUSCULAR; INTRAVENOUS; SUBCUTANEOUS at 15:31

## 2020-01-07 RX ADMIN — HEPARIN SODIUM 5000 UNIT(S): 5000 INJECTION INTRAVENOUS; SUBCUTANEOUS at 17:25

## 2020-01-07 RX ADMIN — CEFTRIAXONE 1000 MILLIGRAM(S): 500 INJECTION, POWDER, FOR SOLUTION INTRAMUSCULAR; INTRAVENOUS at 12:47

## 2020-01-07 RX ADMIN — SODIUM CHLORIDE 1000 MILLILITER(S): 9 INJECTION INTRAMUSCULAR; INTRAVENOUS; SUBCUTANEOUS at 10:06

## 2020-01-07 NOTE — H&P ADULT - HISTORY OF PRESENT ILLNESS
79y M w/ hx Afib s/p watchman, SSS s/p PPM, CAD s/p stent x 7, HTN, HLD, ankylosing spondylosis, hypothyroidism, remote hx colon cancer comes from home with cc of altered mental status and generalized weakness.  Per EMS, wife was concerned about possible UTI.  Pt currently AAOx1, which is reportedly his baseline.  In ED pt has positive UTI and started on Rocephin Pt denies any pain, no sob, no fevers no n/v or abd pain. Pt admitted with UTI and confusion.

## 2020-01-07 NOTE — ED PROVIDER NOTE - CLINICAL SUMMARY MEDICAL DECISION MAKING FREE TEXT BOX
79y M w/ hx Afib s/p watchman, SSS s/p PPM, CAD s/p stent x 7; presenting from home with altered mental status as per family.  Pt currently offers no complaints; no family present at bedside to give additional information.  Will obtain CT head, CXR, EKG, labs, UA, cultures, RVP and reassess.

## 2020-01-07 NOTE — H&P ADULT - NSICDXFAMILYHX_GEN_ALL_CORE_FT
FAMILY HISTORY:  No pertinent family history in first degree relatives, pt does not rrecal his family medical hx

## 2020-01-07 NOTE — ED PROVIDER NOTE - ATTENDING CONTRIBUTION TO CARE
Vivian: I performed a face to face bedside interview with patient regarding history of present illness, review of symptoms and past medical history. I completed an independent physical exam.  I have discussed patient's plan of care with resident.   I agree with note as stated above including HISTORY OF PRESENT ILLNESS, HIV, PAST MEDICAL/SURGICAL/FAMILY/SOCIAL HISTORY, ALLERGIES AND HOME MEDICATIONS, REVIEW OF SYSTEMS, PHYSICAL EXAM, MEDICAL DECISION MAKING and any PROGRESS NOTES during the time I functioned as the attending physician for this patient unless otherwise noted. My brief assessment is as follows: 79M h/o afib, CAD, HTN, HLD p/w AMS from home. Pt AAOx1. Unable to provide additional history.   Gen: Well appearing in NAD  Head: NC/AT  Neck: trachea midline  Resp:  No distress, CTAB  CV: RRR  Ext: no deformities  Neuro:  AAOx1 appears non focal  Skin:  Warm and dry as visualized  Psych:  Normal affect and mood   Patient presenting with altered mental status, likely infectious (UTI vs PNA) vs Neurologic  - CBC, CMP, VBG, UA, CXR  - CTH  - Reassess

## 2020-01-07 NOTE — H&P ADULT - ASSESSMENT
79y M w/ hx Afib s/p watchman, SSS s/p PPM, CAD s/p stent x 7, HTN, HLD, ankylosing spondylosis, hypothyroidism, remote hx colon cancer comes from home with cc of altered mental status and generalized weakness.  Per EMS, wife was concerned about possible UTI.  Pt currently AAOx1, which is reportedly his baseline.  In ED pt has positive UTI and started on Rocephin Pt denies any pain, no sob, no fevers no n/v or abd pain. Pt admitted with UTI and confusion.    1) Acute metabolic encephalopathy due to UTI  - Admit to medicine  - continue Rocephin and ivf  - blood and urine cx sent    2) PJ  - ivf and monitor renal functions    3) Afib s/p watchman  - continue sotalol and Cardizem    4) CAD s/p PCI  - continue aspirin, Plavix and statin    5) HLD  - on statin    6) Hypothyroidism  - continue synthroid  - check TSH level    7) Gout  - continue allopurinol    8) BPH  - on Flomax    9) Depression  - on duloxetine     10) Prophylactic Measure  - DVT ppx: heparin SQ 79y M w/ hx Afib s/p watchman, SSS s/p PPM, CAD s/p stent x 7, HTN, HLD, ankylosing spondylosis, hypothyroidism, remote hx colon cancer comes from home with cc of altered mental status and generalized weakness.  Per EMS, wife was concerned about possible UTI.  Pt currently AAOx1, which is reportedly his baseline.  In ED pt has positive UTI and started on Rocephin Pt denies any pain, no sob, no fevers no n/v or abd pain. Pt admitted with UTI and confusion.    1) Acute metabolic encephalopathy due to UTI  - Admit to medicine  - continue Rocephin and ivf  - blood and urine cx sent    2) PJ  - ivf and monitor renal functions  - hold losartan for now    3) Afib s/p watchman  - continue sotalol and Cardizem    4) CAD s/p PCI  - continue aspirin, Plavix and statin    5) HLD  - on statin    6) HTN  - hold losartan for now due to PJ  - on Cardizem   - monitor BP    7) Hypothyroidism  - continue synthroid  - check TSH level    8) Gout  - continue allopurinol    9) BPH  - on Flomax    10) Depression  - on duloxetine     11) Prophylactic Measure  - DVT ppx: heparin SQ

## 2020-01-07 NOTE — ED ADULT TRIAGE NOTE - CHIEF COMPLAINT QUOTE
pt from home, c/o AMS and weakness. as per EMS wife suspects uti. pt alert to person and place unaware to time. as per EMS that is baseline.

## 2020-01-07 NOTE — ED PROVIDER NOTE - OBJECTIVE STATEMENT
79y M w/ hx Afib s/p watchman, SSS s/p PPM, CAD s/p stent x 7, HTN, HLD, ankylosing spondylosis, hypothyroidism, remote hx colon cancer; presenting from home for altered mental status and generalized weakness.  Per EMS, wife was concerned about possible UTI.  Pt currently AAOx1, which is reportedly his baseline.  Pt currently denies any pain or other complaints; unable to offer additional hx.

## 2020-01-07 NOTE — H&P ADULT - NSHPLABSRESULTS_GEN_ALL_CORE
13.8   8.79  )-----------( 66       ( 07 Jan 2020 08:33 )             41.9    01-07    143  |  103  |  24.0<H>  ----------------------------<  90  4.2   |  26.0  |  1.55<H>    Ca    9.4      07 Jan 2020 08:33    TPro  6.5<L>  /  Alb  3.9  /  TBili  1.6  /  DBili  x   /  AST  21  /  ALT  11  /  AlkPhos  137<H>  01-07      CT brain:  IMPRESSION:  No acute intracranial hemorrhage or acute territorial infarct.  If symptoms persist, follow-up MRI exam recommended.    SANAM DURAN M.D., ATTENDING RADIOLOGIST  This document has been electronically signed. Jan 7 2020  9:39AM      CXR:  IMPRESSION:   No evidence of active chest disease.    CLINT NEWMAN M.D., ATTENDING RADIOLOGIST  This document has been electronically signed. Jan 7 2020 10:33AM

## 2020-01-07 NOTE — ED PROVIDER NOTE - PHYSICAL EXAMINATION
Constitutional: Awake, alert, in no acute distress  Eyes: no scleral icterus  HENT: normocephalic, atraumatic, moist oral mucosa  CV: +irregular rhythm, no murmur  Pulm: non-labored respirations, CTAB  Abdomen: soft, non-tender, non-distended  Extremities: no edema, no deformity  Skin: no rash, no jaundice  Neuro: AAOx1, moving all extremities equally Constitutional: Awake, alert, in no acute distress  Eyes: no scleral icterus  HENT: normocephalic, atraumatic, moist oral mucosa  CV: RRR, no murmur  Pulm: non-labored respirations, CTAB  Abdomen: soft, non-tender, non-distended  Extremities: no edema, no deformity  Skin: no rash, no jaundice  Neuro: AAOx1, moving all extremities equally

## 2020-01-07 NOTE — H&P ADULT - NSHPPHYSICALEXAM_GEN_ALL_CORE
PHYSICAL EXAM:  Vital Signs Last 24 Hrs  T(C): 36.9 (07 Jan 2020 11:31), Max: 37.4 (07 Jan 2020 08:41)  T(F): 98.4 (07 Jan 2020 11:31), Max: 99.4 (07 Jan 2020 08:41)  HR: 82 (07 Jan 2020 11:31) (82 - 100)  BP: 174/90 (07 Jan 2020 11:31) (143/85 - 174/90)  BP(mean): --  RR: 20 (07 Jan 2020 11:31) (20 - 20)  SpO2: 98% (07 Jan 2020 11:31) (98% - 98%)    GENERAL: NAD, well-groomed, well-developed  HEAD:  Atraumatic, Normocephalic  EYES: EOMI, PERRLA, conjunctiva and sclera clear  ENMT: No tonsillar erythema, exudates, or enlargement; Moist mucous membranes  NERVOUS SYSTEM:  Motor Strength 5/5 B/L upper and lower extremities  CHEST/LUNG: Clear to auscultation bilaterally; No rales, rhonchi, wheezing  HEART: Regular rate and rhythm; No murmurs  ABDOMEN: Soft, Nontender, Nondistended; Bowel sounds present  EXTREMITIES:  2+ Peripheral Pulses, No clubbing, cyanosis, or edema

## 2020-01-07 NOTE — H&P ADULT - NSICDXPASTMEDICALHX_GEN_ALL_CORE_FT
PAST MEDICAL HISTORY:  Alzheimer disease     Atrial fibrillation, unspecified type     CHF (congestive heart failure)     Chronic gout, unspecified cause, unspecified site     Essential hypertension     Pacemaker     Prostate CA

## 2020-01-08 LAB
ANION GAP SERPL CALC-SCNC: 12 MMOL/L — SIGNIFICANT CHANGE UP (ref 5–17)
BUN SERPL-MCNC: 26 MG/DL — HIGH (ref 8–20)
CALCIUM SERPL-MCNC: 8.7 MG/DL — SIGNIFICANT CHANGE UP (ref 8.6–10.2)
CHLORIDE SERPL-SCNC: 102 MMOL/L — SIGNIFICANT CHANGE UP (ref 98–107)
CO2 SERPL-SCNC: 24 MMOL/L — SIGNIFICANT CHANGE UP (ref 22–29)
CREAT SERPL-MCNC: 1.5 MG/DL — HIGH (ref 0.5–1.3)
GLUCOSE SERPL-MCNC: 95 MG/DL — SIGNIFICANT CHANGE UP (ref 70–115)
HCT VFR BLD CALC: 38.9 % — LOW (ref 39–50)
HGB BLD-MCNC: 12.7 G/DL — LOW (ref 13–17)
MAGNESIUM SERPL-MCNC: 1.9 MG/DL — SIGNIFICANT CHANGE UP (ref 1.6–2.6)
MCHC RBC-ENTMCNC: 32.6 GM/DL — SIGNIFICANT CHANGE UP (ref 32–36)
MCHC RBC-ENTMCNC: 33.1 PG — SIGNIFICANT CHANGE UP (ref 27–34)
MCV RBC AUTO: 101.3 FL — HIGH (ref 80–100)
PLATELET # BLD AUTO: 58 K/UL — LOW (ref 150–400)
POTASSIUM SERPL-MCNC: 3.9 MMOL/L — SIGNIFICANT CHANGE UP (ref 3.5–5.3)
POTASSIUM SERPL-SCNC: 3.9 MMOL/L — SIGNIFICANT CHANGE UP (ref 3.5–5.3)
RBC # BLD: 3.84 M/UL — LOW (ref 4.2–5.8)
RBC # FLD: 13.4 % — SIGNIFICANT CHANGE UP (ref 10.3–14.5)
SODIUM SERPL-SCNC: 138 MMOL/L — SIGNIFICANT CHANGE UP (ref 135–145)
TSH SERPL-MCNC: 2.1 UIU/ML — SIGNIFICANT CHANGE UP (ref 0.27–4.2)
WBC # BLD: 7.5 K/UL — SIGNIFICANT CHANGE UP (ref 3.8–10.5)
WBC # FLD AUTO: 7.5 K/UL — SIGNIFICANT CHANGE UP (ref 3.8–10.5)

## 2020-01-08 PROCEDURE — 99232 SBSQ HOSP IP/OBS MODERATE 35: CPT

## 2020-01-08 RX ORDER — HYDRALAZINE HCL 50 MG
10 TABLET ORAL ONCE
Refills: 0 | Status: COMPLETED | OUTPATIENT
Start: 2020-01-08 | End: 2020-01-08

## 2020-01-08 RX ADMIN — Medication 80 MILLIGRAM(S): at 18:58

## 2020-01-08 RX ADMIN — TAMSULOSIN HYDROCHLORIDE 0.4 MILLIGRAM(S): 0.4 CAPSULE ORAL at 21:50

## 2020-01-08 RX ADMIN — CLOPIDOGREL BISULFATE 75 MILLIGRAM(S): 75 TABLET, FILM COATED ORAL at 11:43

## 2020-01-08 RX ADMIN — HEPARIN SODIUM 5000 UNIT(S): 5000 INJECTION INTRAVENOUS; SUBCUTANEOUS at 05:28

## 2020-01-08 RX ADMIN — HEPARIN SODIUM 5000 UNIT(S): 5000 INJECTION INTRAVENOUS; SUBCUTANEOUS at 21:50

## 2020-01-08 RX ADMIN — Medication 80 MILLIGRAM(S): at 05:28

## 2020-01-08 RX ADMIN — Medication 10 MILLIGRAM(S): at 01:26

## 2020-01-08 RX ADMIN — Medication 50 MICROGRAM(S): at 05:28

## 2020-01-08 RX ADMIN — SODIUM CHLORIDE 75 MILLILITER(S): 9 INJECTION INTRAMUSCULAR; INTRAVENOUS; SUBCUTANEOUS at 19:01

## 2020-01-08 RX ADMIN — CEFTRIAXONE 100 MILLIGRAM(S): 500 INJECTION, POWDER, FOR SOLUTION INTRAMUSCULAR; INTRAVENOUS at 11:43

## 2020-01-08 RX ADMIN — ATORVASTATIN CALCIUM 20 MILLIGRAM(S): 80 TABLET, FILM COATED ORAL at 21:50

## 2020-01-08 RX ADMIN — SODIUM CHLORIDE 75 MILLILITER(S): 9 INJECTION INTRAMUSCULAR; INTRAVENOUS; SUBCUTANEOUS at 11:43

## 2020-01-08 RX ADMIN — Medication 300 MILLIGRAM(S): at 11:43

## 2020-01-08 RX ADMIN — Medication 81 MILLIGRAM(S): at 11:43

## 2020-01-08 RX ADMIN — DULOXETINE HYDROCHLORIDE 30 MILLIGRAM(S): 30 CAPSULE, DELAYED RELEASE ORAL at 11:43

## 2020-01-08 RX ADMIN — Medication 180 MILLIGRAM(S): at 05:28

## 2020-01-08 NOTE — PHYSICAL THERAPY INITIAL EVALUATION ADULT - ADDITIONAL COMMENTS
pt states that he lives in a condo with his wife with no stairs to enter and no stairs inside. pt states that he owns a RW, rollator and SAC. as per pt he would sometimes use the RW when ambulating, need to clarify with wife. pt states that he lives in a condo with his wife with no stairs to enter and no stairs inside. pt states that he owns a RW, rollator and SAC. as per pt he would sometimes use the RW when ambulating, need to clarify with wife. pt states that his wife is able to assist as needed at home.

## 2020-01-08 NOTE — PHYSICAL THERAPY INITIAL EVALUATION ADULT - GENERAL OBSERVATIONS, REHAB EVAL
Pt received in EDHR stretcher bed + IV. pt in NAD and agreeable to mobilize with PT. JEYSON Miranda cleared pt for PT.

## 2020-01-08 NOTE — CHART NOTE - NSCHARTNOTEFT_GEN_A_CORE
Called by Rn with pt's BP of 173/75 P 67.  Asymptomatic.  Hydralazine 10mg IVPx1 ordered stat.  RN to monitor and escalate if BP doesn't improve.

## 2020-01-08 NOTE — PROGRESS NOTE ADULT - SUBJECTIVE AND OBJECTIVE BOX
ALEKSANDAR HOWARD  ----------------------------------------  The patient was seen and evaluated for urinary tract infection. The patient is somnolent but awakened with voice. Offered no complaints.    Vital Signs Last 24 Hrs  T(C): 36.4 (2020 11:35), Max: 36.8 (2020 04:33)  T(F): 97.5 (2020 11:35), Max: 98.3 (2020 07:49)  HR: 59 (2020 11:35) (59 - 74)  BP: 128/70 (2020 11:35) (128/70 - 173/75)  BP(mean): --  RR: 18 (2020 11:35) (18 - 20)  SpO2: 98% (2020 11:35) (94% - 99%)    PHYSICAL EXAMINATION:  ----------------------------------------  General appearance: No acute distress, Awake, Alert  HEENT: Normocephalic, Atraumatic, Conjunctiva clear  Neck: Supple, No JVD, No tenderness  Lungs: Breath sound equal bilaterally, No wheezes, No rales  Cardiovascular: S1S2, Regular rhythm  Abdomen: Soft, Nontender, Nondistended, No guarding/rebound, Positive bowel sounds  Extremities: No clubbing, No cyanosis, No edema, No calf tenderness  Neuro: Strength equal bilaterally, No tremors  Psychiatric: Appropriate mood, disoriented    LABORATORY STUDIES:  ----------------------------------------             12.7   7.50  )-----------( 58       ( 2020 13:27 )             38.9     01-08    138  |  102  |  26.0<H>  ----------------------------<  95  3.9   |  24.0  |  1.50<H>    Ca    8.7      2020 13:27  Mg     1.9     -    TPro  6.5<L>  /  Alb  3.9  /  TBili  1.6  /  DBili  x   /  AST  21  /  ALT  11  /  AlkPhos  137<H>      LIVER FUNCTIONS - ( 2020 08:33 )  Alb: 3.9 g/dL / Pro: 6.5 g/dL / ALK PHOS: 137 U/L / ALT: 11 U/L / AST: 21 U/L / GGT: x           Urinalysis Basic - ( 2020 11:04 )  Color: Yellow / Appearance: Slightly Turbid / S.010 / pH: x  Gluc: x / Ketone: Small  / Bili: Negative / Urobili: 1   Blood: x / Protein: 30 mg/dL / Nitrite: Positive   Leuk Esterase: Moderate / RBC: >50 /HPF / WBC 6-10   Sq Epi: x / Non Sq Epi: Negative / Bacteria: Many    MEDICATIONS  (STANDING):  allopurinol 300 milliGRAM(s) Oral daily  aspirin enteric coated 81 milliGRAM(s) Oral daily  atorvastatin 20 milliGRAM(s) Oral at bedtime  cefTRIAXone   IVPB 1000 milliGRAM(s) IV Intermittent every 24 hours  clopidogrel Tablet 75 milliGRAM(s) Oral daily  diltiazem    milliGRAM(s) Oral daily  DULoxetine 30 milliGRAM(s) Oral daily  heparin  Injectable 5000 Unit(s) SubCutaneous every 12 hours  levothyroxine 50 MICROGram(s) Oral daily  sodium chloride 0.9%. 1000 milliLiter(s) (75 mL/Hr) IV Continuous <Continuous>  sotalol 80 milliGRAM(s) Oral two times a day  tamsulosin 0.4 milliGRAM(s) Oral at bedtime      ASSESSMENT / PLAN:  ----------------------------------------  79M with a history of Alzheimer's dementia, coronary artery disease, atrial fibrillation, hypothyroidism, and ankylosing spondylosis who presented with altered mental status and was found to have an abnormal urinalysis indicative of a urinary tract infection.    Urinary tract infection - On ceftriaxone. Culture results to be followed.    Metabolic encephalopathy - Secondary to underlying infection.     Chronic kidney disease - Review of prior studies noted similar renal function.     Atrial fibrillation - On diltiazem and sotalol. Prior history of Watchman procedure noted.    Coronary artery disease - On aspirin, clopidogrel, and atorvastatin.    Hypertension - Close blood pressure monitoring.    Hypothyroidism - On levothyroxine. ALEKSANDAR HOWARD  ----------------------------------------  The patient was seen and evaluated for urinary tract infection. The patient is somnolent but awakened with voice. Offered no complaints.    Vital Signs Last 24 Hrs  T(C): 36.4 (2020 11:35), Max: 36.8 (2020 04:33)  T(F): 97.5 (2020 11:35), Max: 98.3 (2020 07:49)  HR: 59 (2020 11:35) (59 - 74)  BP: 128/70 (2020 11:35) (128/70 - 173/75)  BP(mean): --  RR: 18 (2020 11:35) (18 - 20)  SpO2: 98% (2020 11:35) (94% - 99%)    PHYSICAL EXAMINATION:  ----------------------------------------  General appearance: No acute distress, Awake, Alert  HEENT: Normocephalic, Atraumatic, Conjunctiva clear  Neck: Supple, No JVD, No tenderness  Lungs: Breath sound equal bilaterally, No wheezes, No rales  Cardiovascular: S1S2, Regular rhythm  Abdomen: Soft, Nontender, Nondistended, No guarding/rebound, Positive bowel sounds  Extremities: No clubbing, No cyanosis, No edema, No calf tenderness  Neuro: Strength equal bilaterally, No tremors  Psychiatric: Appropriate mood, disoriented    LABORATORY STUDIES:  ----------------------------------------             12.7   7.50  )-----------( 58       ( 2020 13:27 )             38.9     01-08    138  |  102  |  26.0<H>  ----------------------------<  95  3.9   |  24.0  |  1.50<H>    Ca    8.7      2020 13:27  Mg     1.9     -    TPro  6.5<L>  /  Alb  3.9  /  TBili  1.6  /  DBili  x   /  AST  21  /  ALT  11  /  AlkPhos  137<H>      LIVER FUNCTIONS - ( 2020 08:33 )  Alb: 3.9 g/dL / Pro: 6.5 g/dL / ALK PHOS: 137 U/L / ALT: 11 U/L / AST: 21 U/L / GGT: x           Urinalysis Basic - ( 2020 11:04 )  Color: Yellow / Appearance: Slightly Turbid / S.010 / pH: x  Gluc: x / Ketone: Small  / Bili: Negative / Urobili: 1   Blood: x / Protein: 30 mg/dL / Nitrite: Positive   Leuk Esterase: Moderate / RBC: >50 /HPF / WBC 6-10   Sq Epi: x / Non Sq Epi: Negative / Bacteria: Many    MEDICATIONS  (STANDING):  allopurinol 300 milliGRAM(s) Oral daily  aspirin enteric coated 81 milliGRAM(s) Oral daily  atorvastatin 20 milliGRAM(s) Oral at bedtime  cefTRIAXone   IVPB 1000 milliGRAM(s) IV Intermittent every 24 hours  clopidogrel Tablet 75 milliGRAM(s) Oral daily  diltiazem    milliGRAM(s) Oral daily  DULoxetine 30 milliGRAM(s) Oral daily  heparin  Injectable 5000 Unit(s) SubCutaneous every 12 hours  levothyroxine 50 MICROGram(s) Oral daily  sodium chloride 0.9%. 1000 milliLiter(s) (75 mL/Hr) IV Continuous <Continuous>  sotalol 80 milliGRAM(s) Oral two times a day  tamsulosin 0.4 milliGRAM(s) Oral at bedtime      ASSESSMENT / PLAN:  ----------------------------------------  79M with a history of Alzheimer's dementia, coronary artery disease, atrial fibrillation, hypothyroidism, and ankylosing spondylosis who presented with altered mental status and was found to have an abnormal urinalysis indicative of a urinary tract infection.    Urinary tract infection - On ceftriaxone. Culture results to be followed.    Metabolic encephalopathy - Secondary to underlying infection.     Chronic kidney disease - Review of prior studies noted similar renal function.     Atrial fibrillation - On diltiazem and sotalol. Prior history of Watchman procedure noted.    Coronary artery disease - On aspirin, clopidogrel, and atorvastatin.    Hypertension - Close blood pressure monitoring.    Hypothyroidism - On levothyroxine.    Thrombocytopenia - Platelet count at similar values in comparison to prior studies. No bleeding noted.

## 2020-01-09 LAB
-  AMIKACIN: SIGNIFICANT CHANGE UP
-  AMPICILLIN/SULBACTAM: SIGNIFICANT CHANGE UP
-  AMPICILLIN: SIGNIFICANT CHANGE UP
-  AZTREONAM: SIGNIFICANT CHANGE UP
-  CEFAZOLIN: SIGNIFICANT CHANGE UP
-  CEFEPIME: SIGNIFICANT CHANGE UP
-  CEFOXITIN: SIGNIFICANT CHANGE UP
-  CEFTRIAXONE: SIGNIFICANT CHANGE UP
-  CIPROFLOXACIN: SIGNIFICANT CHANGE UP
-  GENTAMICIN: SIGNIFICANT CHANGE UP
-  IMIPENEM: SIGNIFICANT CHANGE UP
-  LEVOFLOXACIN: SIGNIFICANT CHANGE UP
-  MEROPENEM: SIGNIFICANT CHANGE UP
-  NITROFURANTOIN: SIGNIFICANT CHANGE UP
-  PIPERACILLIN/TAZOBACTAM: SIGNIFICANT CHANGE UP
-  TIGECYCLINE: SIGNIFICANT CHANGE UP
-  TOBRAMYCIN: SIGNIFICANT CHANGE UP
-  TRIMETHOPRIM/SULFAMETHOXAZOLE: SIGNIFICANT CHANGE UP
ANION GAP SERPL CALC-SCNC: 11 MMOL/L — SIGNIFICANT CHANGE UP (ref 5–17)
BUN SERPL-MCNC: 24 MG/DL — HIGH (ref 8–20)
CALCIUM SERPL-MCNC: 8.6 MG/DL — SIGNIFICANT CHANGE UP (ref 8.6–10.2)
CHLORIDE SERPL-SCNC: 104 MMOL/L — SIGNIFICANT CHANGE UP (ref 98–107)
CO2 SERPL-SCNC: 22 MMOL/L — SIGNIFICANT CHANGE UP (ref 22–29)
CREAT SERPL-MCNC: 1.15 MG/DL — SIGNIFICANT CHANGE UP (ref 0.5–1.3)
CULTURE RESULTS: SIGNIFICANT CHANGE UP
GLUCOSE SERPL-MCNC: 84 MG/DL — SIGNIFICANT CHANGE UP (ref 70–115)
HCT VFR BLD CALC: 37.6 % — LOW (ref 39–50)
HGB BLD-MCNC: 12.3 G/DL — LOW (ref 13–17)
MCHC RBC-ENTMCNC: 32.7 GM/DL — SIGNIFICANT CHANGE UP (ref 32–36)
MCHC RBC-ENTMCNC: 32.7 PG — SIGNIFICANT CHANGE UP (ref 27–34)
MCV RBC AUTO: 100 FL — SIGNIFICANT CHANGE UP (ref 80–100)
METHOD TYPE: SIGNIFICANT CHANGE UP
ORGANISM # SPEC MICROSCOPIC CNT: SIGNIFICANT CHANGE UP
ORGANISM # SPEC MICROSCOPIC CNT: SIGNIFICANT CHANGE UP
PLATELET # BLD AUTO: 59 K/UL — LOW (ref 150–400)
POTASSIUM SERPL-MCNC: 3.8 MMOL/L — SIGNIFICANT CHANGE UP (ref 3.5–5.3)
POTASSIUM SERPL-SCNC: 3.8 MMOL/L — SIGNIFICANT CHANGE UP (ref 3.5–5.3)
RBC # BLD: 3.76 M/UL — LOW (ref 4.2–5.8)
RBC # FLD: 13.2 % — SIGNIFICANT CHANGE UP (ref 10.3–14.5)
SODIUM SERPL-SCNC: 137 MMOL/L — SIGNIFICANT CHANGE UP (ref 135–145)
SPECIMEN SOURCE: SIGNIFICANT CHANGE UP
WBC # BLD: 6.22 K/UL — SIGNIFICANT CHANGE UP (ref 3.8–10.5)
WBC # FLD AUTO: 6.22 K/UL — SIGNIFICANT CHANGE UP (ref 3.8–10.5)

## 2020-01-09 PROCEDURE — 99232 SBSQ HOSP IP/OBS MODERATE 35: CPT

## 2020-01-09 RX ADMIN — ATORVASTATIN CALCIUM 20 MILLIGRAM(S): 80 TABLET, FILM COATED ORAL at 21:15

## 2020-01-09 RX ADMIN — CEFTRIAXONE 100 MILLIGRAM(S): 500 INJECTION, POWDER, FOR SOLUTION INTRAMUSCULAR; INTRAVENOUS at 12:19

## 2020-01-09 RX ADMIN — Medication 180 MILLIGRAM(S): at 05:26

## 2020-01-09 RX ADMIN — Medication 300 MILLIGRAM(S): at 12:22

## 2020-01-09 RX ADMIN — CLOPIDOGREL BISULFATE 75 MILLIGRAM(S): 75 TABLET, FILM COATED ORAL at 12:22

## 2020-01-09 RX ADMIN — HEPARIN SODIUM 5000 UNIT(S): 5000 INJECTION INTRAVENOUS; SUBCUTANEOUS at 05:25

## 2020-01-09 RX ADMIN — Medication 81 MILLIGRAM(S): at 12:21

## 2020-01-09 RX ADMIN — Medication 80 MILLIGRAM(S): at 17:29

## 2020-01-09 RX ADMIN — Medication 80 MILLIGRAM(S): at 05:25

## 2020-01-09 RX ADMIN — DULOXETINE HYDROCHLORIDE 30 MILLIGRAM(S): 30 CAPSULE, DELAYED RELEASE ORAL at 12:22

## 2020-01-09 RX ADMIN — TAMSULOSIN HYDROCHLORIDE 0.4 MILLIGRAM(S): 0.4 CAPSULE ORAL at 21:15

## 2020-01-09 RX ADMIN — Medication 50 MICROGRAM(S): at 05:25

## 2020-01-09 RX ADMIN — HEPARIN SODIUM 5000 UNIT(S): 5000 INJECTION INTRAVENOUS; SUBCUTANEOUS at 17:29

## 2020-01-09 RX ADMIN — SODIUM CHLORIDE 75 MILLILITER(S): 9 INJECTION INTRAMUSCULAR; INTRAVENOUS; SUBCUTANEOUS at 21:15

## 2020-01-09 NOTE — PROGRESS NOTE ADULT - SUBJECTIVE AND OBJECTIVE BOX
ALEKSANDAR HOWARD  ----------------------------------------  The patient was seen and evaluated for urinary tract infection. Offers no complaints.    Vital Signs Last 24 Hrs  T(C): 36.6 (09 Jan 2020 08:38), Max: 36.6 (08 Jan 2020 15:23)  T(F): 97.9 (09 Jan 2020 08:38), Max: 97.9 (09 Jan 2020 04:35)  HR: 63 (09 Jan 2020 08:38) (60 - 63)  BP: 160/79 (09 Jan 2020 08:38) (135/60 - 162/71)  BP(mean): --  RR: 18 (09 Jan 2020 08:38) (16 - 20)  SpO2: 98% (09 Jan 2020 08:38) (98% - 99%)    PHYSICAL EXAMINATION:  ----------------------------------------  General appearance: No acute distress, Awake, Alert  HEENT: Normocephalic, Atraumatic, Conjunctiva clear  Neck: Supple, No JVD, No tenderness  Lungs: Breath sound equal bilaterally, No wheezes, No rales  Cardiovascular: S1S2, Regular rhythm  Abdomen: Soft, Nontender, Nondistended, No guarding/rebound, Positive bowel sounds  Extremities: No clubbing, No cyanosis, No edema, No calf tenderness  Neuro: Strength equal bilaterally, No tremors  Psychiatric: Appropriate mood and affect    LABORATORY STUDIES:  ----------------------------------------             12.3   6.22  )-----------( 59       ( 09 Jan 2020 08:21 )             37.6     01-09    137  |  104  |  24.0<H>  ----------------------------<  84  3.8   |  22.0  |  1.15    Ca    8.6      09 Jan 2020 08:21  Mg     1.9     01-08    Culture - Urine (collected 07 Jan 2020 16:25)  Source: .Urine  Preliminary Report (08 Jan 2020 17:19):    >100,000 CFU/ml Gram Negative Rods    Culture - Blood (collected 07 Jan 2020 08:52)  Source: .Blood  Preliminary Report (09 Jan 2020 09:01):    No growth at 48 hours    Culture - Blood (collected 07 Jan 2020 08:33)  Source: .Blood  Preliminary Report (09 Jan 2020 09:01):    No growth at 48 hours    MEDICATIONS  (STANDING):  allopurinol 300 milliGRAM(s) Oral daily  aspirin enteric coated 81 milliGRAM(s) Oral daily  atorvastatin 20 milliGRAM(s) Oral at bedtime  cefTRIAXone   IVPB 1000 milliGRAM(s) IV Intermittent every 24 hours  clopidogrel Tablet 75 milliGRAM(s) Oral daily  diltiazem    milliGRAM(s) Oral daily  DULoxetine 30 milliGRAM(s) Oral daily  heparin  Injectable 5000 Unit(s) SubCutaneous every 12 hours  levothyroxine 50 MICROGram(s) Oral daily  sodium chloride 0.9%. 1000 milliLiter(s) (75 mL/Hr) IV Continuous <Continuous>  sotalol 80 milliGRAM(s) Oral two times a day  tamsulosin 0.4 milliGRAM(s) Oral at bedtime      ASSESSMENT / PLAN:  ----------------------------------------  79M with a history of Alzheimer's dementia, coronary artery disease, atrial fibrillation, hypothyroidism, and ankylosing spondylosis who presented with altered mental status and was found to have an abnormal urinalysis indicative of a urinary tract infection.    Urinary tract infection - On ceftriaxone. Blood cultures were without growth. Urine culture noted gram negative rods.    Metabolic encephalopathy - Secondary to underlying infection.  Improved.    Chronic kidney disease - Renal function improved.    Atrial fibrillation - On diltiazem and sotalol. Prior history of Watchman procedure noted.    Coronary artery disease - On aspirin, clopidogrel, and atorvastatin.    Hypertension - Close blood pressure monitoring.    Hypothyroidism - On levothyroxine.    Thrombocytopenia - Platelet count at similar values in comparison to prior studies. No bleeding noted.

## 2020-01-10 ENCOUNTER — TRANSCRIPTION ENCOUNTER (OUTPATIENT)
Age: 80
End: 2020-01-10

## 2020-01-10 PROCEDURE — 99232 SBSQ HOSP IP/OBS MODERATE 35: CPT

## 2020-01-10 RX ORDER — LEVOTHYROXINE SODIUM 125 MCG
1 TABLET ORAL
Qty: 0 | Refills: 0 | DISCHARGE
Start: 2020-01-10

## 2020-01-10 RX ORDER — ATORVASTATIN CALCIUM 80 MG/1
1 TABLET, FILM COATED ORAL
Qty: 0 | Refills: 0 | DISCHARGE

## 2020-01-10 RX ORDER — DULOXETINE HYDROCHLORIDE 30 MG/1
1 CAPSULE, DELAYED RELEASE ORAL
Qty: 0 | Refills: 0 | DISCHARGE

## 2020-01-10 RX ORDER — LEVOTHYROXINE SODIUM 125 MCG
50 TABLET ORAL
Qty: 0 | Refills: 0 | DISCHARGE

## 2020-01-10 RX ORDER — CEFPODOXIME PROXETIL 100 MG
1 TABLET ORAL
Qty: 10 | Refills: 0
Start: 2020-01-10 | End: 2020-01-14

## 2020-01-10 RX ORDER — CLOPIDOGREL BISULFATE 75 MG/1
1 TABLET, FILM COATED ORAL
Qty: 0 | Refills: 0 | DISCHARGE

## 2020-01-10 RX ORDER — SOTALOL HCL 120 MG
1 TABLET ORAL
Qty: 0 | Refills: 0 | DISCHARGE

## 2020-01-10 RX ORDER — ALLOPURINOL 300 MG
1 TABLET ORAL
Qty: 0 | Refills: 0 | DISCHARGE

## 2020-01-10 RX ORDER — ASPIRIN/CALCIUM CARB/MAGNESIUM 324 MG
1 TABLET ORAL
Qty: 0 | Refills: 0 | DISCHARGE

## 2020-01-10 RX ORDER — DILTIAZEM HCL 120 MG
1 CAPSULE, EXT RELEASE 24 HR ORAL
Qty: 0 | Refills: 0 | DISCHARGE

## 2020-01-10 RX ADMIN — SODIUM CHLORIDE 75 MILLILITER(S): 9 INJECTION INTRAMUSCULAR; INTRAVENOUS; SUBCUTANEOUS at 11:11

## 2020-01-10 RX ADMIN — Medication 180 MILLIGRAM(S): at 05:47

## 2020-01-10 RX ADMIN — HEPARIN SODIUM 5000 UNIT(S): 5000 INJECTION INTRAVENOUS; SUBCUTANEOUS at 05:47

## 2020-01-10 RX ADMIN — Medication 300 MILLIGRAM(S): at 10:50

## 2020-01-10 RX ADMIN — ATORVASTATIN CALCIUM 20 MILLIGRAM(S): 80 TABLET, FILM COATED ORAL at 20:51

## 2020-01-10 RX ADMIN — Medication 81 MILLIGRAM(S): at 10:51

## 2020-01-10 RX ADMIN — CLOPIDOGREL BISULFATE 75 MILLIGRAM(S): 75 TABLET, FILM COATED ORAL at 10:50

## 2020-01-10 RX ADMIN — Medication 50 MICROGRAM(S): at 05:47

## 2020-01-10 RX ADMIN — DULOXETINE HYDROCHLORIDE 30 MILLIGRAM(S): 30 CAPSULE, DELAYED RELEASE ORAL at 10:50

## 2020-01-10 RX ADMIN — CEFTRIAXONE 100 MILLIGRAM(S): 500 INJECTION, POWDER, FOR SOLUTION INTRAMUSCULAR; INTRAVENOUS at 11:10

## 2020-01-10 RX ADMIN — Medication 80 MILLIGRAM(S): at 05:47

## 2020-01-10 RX ADMIN — HEPARIN SODIUM 5000 UNIT(S): 5000 INJECTION INTRAVENOUS; SUBCUTANEOUS at 17:35

## 2020-01-10 RX ADMIN — TAMSULOSIN HYDROCHLORIDE 0.4 MILLIGRAM(S): 0.4 CAPSULE ORAL at 20:51

## 2020-01-10 RX ADMIN — Medication 80 MILLIGRAM(S): at 17:35

## 2020-01-10 RX ADMIN — SODIUM CHLORIDE 75 MILLILITER(S): 9 INJECTION INTRAMUSCULAR; INTRAVENOUS; SUBCUTANEOUS at 17:35

## 2020-01-10 NOTE — DISCHARGE NOTE PROVIDER - HOSPITAL COURSE
79M presented with altered mental status and generalized weakness. On presentation, WBC(8.79), Platelet(66). CT of the head was without acute intracranial pathology. Urinalysis was noted to be abnormal and indicative of a urinary tract infection and intravenous antibiotics were initiated. Losartan was held on admission due to acute kidney injury. The patient was evaluated by Physical Therapy and thought to be appropriate for return home with assistance upon discharge from the hospital. Repeat laboratory studies noted improvement in the renal function. Blood cultures were without growth. Urine culture grew Klebsiella. The patient was discharged home on oral antibiotics with instructions to follow up with his primary care physician for further management.         35 minutes total time 79M with a history of Alzheimer's dementia, coronary artery disease, atrial fibrillation, hypothyroidism, and ankylosing spondylosis who presented with altered mental status and was found to have an abnormal urinalysis indicative of a urinary tract infection. Intravenous antibiotics were initiated for a urinary tract infection with urine culture ultimately growing Klebsiella. abx course completed inpatient.         Urinary tract infection - Urine culture with Klebsiella. Blood cultures were without growth. Afebrile. resolved sp ceftriaxone.        acute toxic Metabolic encephalopathy - resolved        Chronic kidney disease 2/3 - stable. baseline 1.3-1.5        Atrial fibrillation - On diltiazem and sotalol. Prior history of Watchman procedure noted.        Hypertension - Close blood pressure monitoring. intermittently uncontrolled, sbp into 170s. asymptomatic. on losartan 100mg @ home, resume 1/2 dose, titrate prn        Thrombocytopenia - Platelet count at similar values in comparison to prior studies. No bleeding noted.        dvt ppx heparin        dispo. PT appreciated - home w/ assist + home PT but family unable to care for her, pending alex. medically stable        outpt fu. pmd        PHYSICAL EXAM.        GEN - appears age appropriate. well nourished. pleasant. no distress.     HEENT - NCAT, EOMI, MICAH    RESP - CTA BL, no wheeze/stridor/rhonchi/crackles. not on supplemental O2. able to speak in full sentences without distress.     CARDIO - NS1S2, RRR. No murmurs/rubs/gallops.    ABD - Soft/Non tender/Non distended. Normal BS x4 quadrants. no guarding/rebound tenderness.    Ext - No ERIN.    Neuro - AAOx3. cn 2-12 grossly intact    Psych - normal affect    Skin - c/d/i. no rashes/lesions 79M with a history of Alzheimer's dementia, coronary artery disease, atrial fibrillation, hypothyroidism, and ankylosing spondylosis who presented with altered mental status and was found to have an abnormal urinalysis indicative of a urinary tract infection. Intravenous antibiotics were initiated for a urinary tract infection with urine culture ultimately growing Klebsiella. abx course completed inpatient.         Urinary tract infection - Urine culture with Klebsiella. Blood cultures were without growth. Afebrile. resolved sp ceftriaxone.        acute toxic Metabolic encephalopathy - resolved        Chronic kidney disease 2/3 - stable. baseline 1.3-1.5        Atrial fibrillation - On diltiazem and sotalol. Prior history of Watchman procedure noted.        Hypertension - Close blood pressure monitoring. intermittently uncontrolled, sbp into 170s. asymptomatic. on losartan 100mg @ home, resume 1/2 dose, titrate prn        Thrombocytopenia - Platelet count at similar values in comparison to prior studies. No bleeding noted.        dvt ppx heparin        dispo. PT appreciated - home w/ assist + home PT but family unable to care for her, pending alex. medically stable        outpt fu. pmd        PHYSICAL EXAM.        GEN - appears age appropriate. well nourished. pleasant. no distress.     HEENT - NCAT, EOMI, MICAH    RESP - CTA BL, no wheeze/stridor/rhonchi/crackles. not on supplemental O2. able to speak in full sentences without distress.     CARDIO - NS1S2, RRR. No murmurs/rubs/gallops.    ABD - Soft/Non tender/Non distended. Normal BS x4 quadrants. no guarding/rebound tenderness.    Ext - No ERIN.    Neuro - AAOx3. cn 2-12 grossly intact    Psych - normal affect    Skin - c/d/i. no rashes/lesions        Vital Signs Last 24 Hrs    T(C): 36.6 (13 Jan 2020 07:24), Max: 36.8 (12 Jan 2020 16:06)    T(F): 97.9 (13 Jan 2020 07:24), Max: 98.2 (12 Jan 2020 16:06)    HR: 68 (13 Jan 2020 07:24) (60 - 68)    BP: 163/77 (13 Jan 2020 07:24) (158/60 - 170/73)    BP(mean): --    RR: 18 (13 Jan 2020 07:24) (18 - 18)    SpO2: 96% (13 Jan 2020 07:24) (96% - 98%)

## 2020-01-10 NOTE — DISCHARGE NOTE PROVIDER - NSDCMRMEDTOKEN_GEN_ALL_CORE_FT
allopurinol 300 mg oral tablet: 1 tab(s) orally once a day  aspirin 325 mg oral tablet: 1 tab(s) orally once a day  dilTIAZem 180 mg/24 hours oral capsule, extended release: 1 cap(s) orally once a day  Lipitor 20 mg oral tablet: 1 tab(s) orally once a day  losartan 100 mg oral tablet: 1 tab(s) orally once a day  Plavix 75 mg oral tablet: 1 tab(s) orally once a day  sotalol 80 mg oral tablet: 1 tab(s) orally 2 times a day  tamsulosin 0.4 mg oral capsule: 1 cap(s) orally once a day (at bedtime)  Thyroxine Sodium Pentahydrate: 50 milligram(s) allopurinol 300 mg oral tablet: 1 tab(s) orally once a day  aspirin 325 mg oral tablet: 1 tab(s) orally once a day  cefpodoxime 200 mg oral tablet: 1 tab(s) orally every 12 hours   Cozaar 50 mg oral tablet: 1 tab(s) orally once a day  dilTIAZem 180 mg/24 hours oral capsule, extended release: 1 cap(s) orally once a day  DULoxetine 30 mg oral delayed release capsule: 1 cap(s) orally once a day  levothyroxine 50 mcg (0.05 mg) oral tablet: 1 tab(s) orally once a day  Lipitor 20 mg oral tablet: 1 tab(s) orally once a day  Plavix 75 mg oral tablet: 1 tab(s) orally once a day  sotalol 80 mg oral tablet: 1 tab(s) orally 2 times a day  tamsulosin 0.4 mg oral capsule: 1 cap(s) orally once a day (at bedtime)

## 2020-01-10 NOTE — DISCHARGE NOTE PROVIDER - NSDCFUADDINST_GEN_ALL_CORE_FT
CT HEAD: There is moderate diffuse parenchymal volume loss. There are areas of low attenuation in the periventricular white matter likely related to mild chronic microvascular ischemic changes.    There is no acute intracranial hemorrhage, parenchymal mass, mass effect or midline shift. There is no acute territorial infarct. There is no hydrocephalus. Partial empty sella    The cranium is intact. The visualized paranasal sinuses are well-aerated.    IMPRESSION:  No acute intracranial hemorrhage or acute territorial infarct    Culture - Urine (01.07.20 @ 16:25)    -  Amikacin: S <=16    -  Ampicillin: R 16 These ampicillin results predict results for amoxicillin    -  Ampicillin/Sulbactam: S <=8/4 Enterobacter, Citrobacter, and Serratia may develop resistance during prolonged therapy (3-4 days)    -  Aztreonam: S <=4    -  Cefazolin: S <=8 (MIC_CL_COM_ENTERIC_CEFAZU) For uncomplicated UTI with K. pneumoniae, E. coli, or P. mirablis: AKBAR <=16 is sensitive and AKBAR >=32 is resistant. This also predicts results for oral agents cefaclor, cefdinir, cefpodoxime, cefprozil, cefuroxime axetil, cephalexin and locarbef for uncomplicated UTI. Note that some isolates may be susceptible to these agents while testing resistant to cefazolin.    -  Cefepime: S <=4    -  Cefoxitin: S <=8    -  Ceftriaxone: S <=1 Enterobacter, Citrobacter, and Serratia may develop resistance during prolonged therapy    -  Ciprofloxacin: S <=1    -  Gentamicin: S <=4    -  Imipenem: S <=1    -  Levofloxacin: S <=2    -  Meropenem: S <=1    -  Nitrofurantoin: S <=32 Should not be used to treat pyelonephritis    -  Piperacillin/Tazobactam: S <=16    -  Tigecycline: S <=2    -  Tobramycin: S <=4    -  Trimethoprim/Sulfamethoxazole: S <=2/38    Specimen Source: .Urine    Culture Results:   >100,000 CFU/ml Klebsiella pneumoniae    Organism Identification: Klebsiella pneumoniae    Organism: Klebsiella pneumoniae    Method Type: AKBAR  < end of copied text >    Thyroid Stimulating Hormone, Serum: 2.10 uIU/mL (01.08.20 @ 13:27)

## 2020-01-10 NOTE — DISCHARGE NOTE NURSING/CASE MANAGEMENT/SOCIAL WORK - PATIENT PORTAL LINK FT
You can access the FollowMyHealth Patient Portal offered by [facility] by registering at the following website: [email or web address]. By joining [facility]’s FollowMyHealth portal, you will also be able to view your health information using other applications (apps) compatible with our system.

## 2020-01-10 NOTE — DISCHARGE NOTE PROVIDER - NSDCCPCAREPLAN_GEN_ALL_CORE_FT
PRINCIPAL DISCHARGE DIAGNOSIS  Diagnosis: UTI (urinary tract infection)  Assessment and Plan of Treatment: Complete the course of antibiotics as prescribed. Follow up with your primary care physician for further management.        SECONDARY DISCHARGE DIAGNOSES  Diagnosis: Hypothyroidism  Assessment and Plan of Treatment: Continue on your home thyroid medication.    Diagnosis: Atrial fibrillation  Assessment and Plan of Treatment: Continue on your home cardiac medications.    Diagnosis: Altered mental status  Assessment and Plan of Treatment: Secondary to urinary tract infection. PRINCIPAL DISCHARGE DIAGNOSIS  Diagnosis: UTI (urinary tract infection)  Assessment and Plan of Treatment: You were treated for this condition and at this time it is considered resolved. You may follow up with your doctors as an outpatient.         SECONDARY DISCHARGE DIAGNOSES  Diagnosis: Benign hypertension  Assessment and Plan of Treatment: Follow up with your Primary Care Doctor. Follow a low salt diet. Take your antihypertensive medication every day as prescribed, be sure not to miss them - some doses were adhysted during your stay. If you are experiencing symptoms of uncontrolled high blood pressure such as headaches, lightheadedness, dizziness, nausea/vomiting, chest pain, rapid heart beat, be sure to come to the ER immediately.    Diagnosis: Thrombocytopenia  Assessment and Plan of Treatment: You were noticed to have blood work with some abnormalities. At this time, you are safe to leave the hospital, we do not suspect that anything immediately will come of these findings, but it is something that should be followed to see if it is getting better, staying the same, or getting worse. Be sure to discuss this at your follow up visit with your Primary Care Doctor to have these tests repeated and to see what work up, if any, is necessary to continue managing it.    Diagnosis: Hypothyroidism  Assessment and Plan of Treatment: Continue on your home thyroid medication.    Diagnosis: Atrial fibrillation  Assessment and Plan of Treatment: Continue on your home cardiac medications.    Diagnosis: Altered mental status  Assessment and Plan of Treatment: Secondary to urinary tract infection.

## 2020-01-10 NOTE — PROGRESS NOTE ADULT - SUBJECTIVE AND OBJECTIVE BOX
ALEKSANDAR HOWARD  ----------------------------------------  The patient was seen and evaluated for urinary tract infection. Offers no complaints. Tolerating oral intake.    Vital Signs Last 24 Hrs  T(C): 36.7 (10 Cristiano 2020 07:14), Max: 37.2 (10 Cristiano 2020 05:03)  T(F): 98 (10 Cristiano 2020 07:14), Max: 98.9 (10 Cristiano 2020 05:03)  HR: 61 (10 Cristiano 2020 07:14) (60 - 64)  BP: 162/81 (10 Cristiano 2020 07:14) (141/69 - 162/81)  BP(mean): --  RR: 16 (10 Cristiano 2020 07:14) (16 - 18)  SpO2: 94% (10 Cristiano 2020 07:14) (94% - 99%)    PHYSICAL EXAMINATION:  ----------------------------------------  General appearance: No acute distress, Awake, Alert  HEENT: Normocephalic, Atraumatic, Conjunctiva clear  Neck: Supple, No JVD, No tenderness  Lungs: Breath sound equal bilaterally, No wheezes, No rales  Cardiovascular: S1S2, Regular rhythm  Abdomen: Soft, Nontender, Nondistended, No guarding/rebound, Positive bowel sounds  Extremities: No clubbing, No cyanosis, No edema, No calf tenderness  Neuro: Strength equal bilaterally, No tremors  Psychiatric: Appropriate mood and affect    LABORATORY STUDIES:  ----------------------------------------             12.3   6.22  )-----------( 59       ( 09 Jan 2020 08:21 )             37.6     01-09    137  |  104  |  24.0<H>  ----------------------------<  84  3.8   |  22.0  |  1.15    Ca    8.6      09 Jan 2020 08:21  Mg     1.9     01-08    Culture - Urine (collected 07 Jan 2020 16:25)  Source: .Urine  Final Report (09 Jan 2020 18:27):    >100,000 CFU/ml Klebsiella pneumoniae  Organism: Klebsiella pneumoniae (09 Jan 2020 18:27)  Organism: Klebsiella pneumoniae (09 Jan 2020 18:27)    MEDICATIONS  (STANDING):  allopurinol 300 milliGRAM(s) Oral daily  aspirin enteric coated 81 milliGRAM(s) Oral daily  atorvastatin 20 milliGRAM(s) Oral at bedtime  cefTRIAXone   IVPB 1000 milliGRAM(s) IV Intermittent every 24 hours  clopidogrel Tablet 75 milliGRAM(s) Oral daily  diltiazem    milliGRAM(s) Oral daily  DULoxetine 30 milliGRAM(s) Oral daily  heparin  Injectable 5000 Unit(s) SubCutaneous every 12 hours  levothyroxine 50 MICROGram(s) Oral daily  sodium chloride 0.9%. 1000 milliLiter(s) (75 mL/Hr) IV Continuous <Continuous>  sotalol 80 milliGRAM(s) Oral two times a day  tamsulosin 0.4 milliGRAM(s) Oral at bedtime      ASSESSMENT / PLAN:  ----------------------------------------  79M with a history of Alzheimer's dementia, coronary artery disease, atrial fibrillation, hypothyroidism, and ankylosing spondylosis who presented with altered mental status and was found to have an abnormal urinalysis indicative of a urinary tract infection.    Urinary tract infection - On ceftriaxone. Blood cultures were without growth. Urine culture grew Klebsiella. Afebrile.    Metabolic encephalopathy - Secondary to underlying infection.  Improved.    Chronic kidney disease - Renal function improved.    Atrial fibrillation - On diltiazem and sotalol. Prior history of Watchman procedure noted.    Coronary artery disease - On aspirin, clopidogrel, and atorvastatin.    Hypertension - Close blood pressure monitoring.    Hypothyroidism - On levothyroxine.    Thrombocytopenia - Platelet count at similar values in comparison to prior studies. No bleeding noted.

## 2020-01-11 PROCEDURE — 99232 SBSQ HOSP IP/OBS MODERATE 35: CPT

## 2020-01-11 RX ORDER — HYDRALAZINE HCL 50 MG
10 TABLET ORAL ONCE
Refills: 0 | Status: COMPLETED | OUTPATIENT
Start: 2020-01-11 | End: 2020-01-11

## 2020-01-11 RX ADMIN — TAMSULOSIN HYDROCHLORIDE 0.4 MILLIGRAM(S): 0.4 CAPSULE ORAL at 21:55

## 2020-01-11 RX ADMIN — ATORVASTATIN CALCIUM 20 MILLIGRAM(S): 80 TABLET, FILM COATED ORAL at 21:55

## 2020-01-11 RX ADMIN — Medication 80 MILLIGRAM(S): at 17:01

## 2020-01-11 RX ADMIN — CEFTRIAXONE 100 MILLIGRAM(S): 500 INJECTION, POWDER, FOR SOLUTION INTRAMUSCULAR; INTRAVENOUS at 11:38

## 2020-01-11 RX ADMIN — Medication 300 MILLIGRAM(S): at 11:45

## 2020-01-11 RX ADMIN — Medication 180 MILLIGRAM(S): at 05:28

## 2020-01-11 RX ADMIN — SODIUM CHLORIDE 75 MILLILITER(S): 9 INJECTION INTRAMUSCULAR; INTRAVENOUS; SUBCUTANEOUS at 11:37

## 2020-01-11 RX ADMIN — DULOXETINE HYDROCHLORIDE 30 MILLIGRAM(S): 30 CAPSULE, DELAYED RELEASE ORAL at 11:45

## 2020-01-11 RX ADMIN — HEPARIN SODIUM 5000 UNIT(S): 5000 INJECTION INTRAVENOUS; SUBCUTANEOUS at 05:28

## 2020-01-11 RX ADMIN — Medication 10 MILLIGRAM(S): at 00:33

## 2020-01-11 RX ADMIN — SODIUM CHLORIDE 75 MILLILITER(S): 9 INJECTION INTRAMUSCULAR; INTRAVENOUS; SUBCUTANEOUS at 09:02

## 2020-01-11 RX ADMIN — HEPARIN SODIUM 5000 UNIT(S): 5000 INJECTION INTRAVENOUS; SUBCUTANEOUS at 17:01

## 2020-01-11 RX ADMIN — Medication 50 MICROGRAM(S): at 05:28

## 2020-01-11 RX ADMIN — Medication 81 MILLIGRAM(S): at 11:44

## 2020-01-11 RX ADMIN — Medication 80 MILLIGRAM(S): at 05:28

## 2020-01-11 RX ADMIN — CLOPIDOGREL BISULFATE 75 MILLIGRAM(S): 75 TABLET, FILM COATED ORAL at 11:45

## 2020-01-11 RX ADMIN — SODIUM CHLORIDE 75 MILLILITER(S): 9 INJECTION INTRAMUSCULAR; INTRAVENOUS; SUBCUTANEOUS at 17:00

## 2020-01-11 NOTE — PROGRESS NOTE ADULT - SUBJECTIVE AND OBJECTIVE BOX
ALEKSANDAR HOWARD  ----------------------------------------  The patient was seen and evaluated for urinary tract infection. Offers no complaints. Awake, conversive, eating breakfast.    Vital Signs Last 24 Hrs  T(C): 36.9 (11 Jan 2020 08:24), Max: 36.9 (11 Jan 2020 08:24)  T(F): 98.5 (11 Jan 2020 08:24), Max: 98.5 (11 Jan 2020 08:24)  HR: 68 (11 Jan 2020 08:24) (60 - 80)  BP: 129/65 (11 Jan 2020 08:24) (129/65 - 177/71)  BP(mean): --  RR: 18 (11 Jan 2020 08:24) (17 - 18)  SpO2: 96% (11 Jan 2020 08:24) (96% - 99%)    PHYSICAL EXAMINATION:  ----------------------------------------  General appearance: No acute distress, Awake, Alert  HEENT: Normocephalic, Atraumatic, Conjunctiva clear  Neck: Supple, No JVD, No tenderness  Lungs: Breath sound equal bilaterally, No wheezes, No rales  Cardiovascular: S1S2, Regular rhythm  Abdomen: Soft, Nontender, Nondistended, No guarding/rebound, Positive bowel sounds  Extremities: No clubbing, No cyanosis, No edema, No calf tenderness  Neuro: Strength equal bilaterally, No tremors  Psychiatric: Appropriate mood and affect    MEDICATIONS  (STANDING):  allopurinol 300 milliGRAM(s) Oral daily  aspirin enteric coated 81 milliGRAM(s) Oral daily  atorvastatin 20 milliGRAM(s) Oral at bedtime  cefTRIAXone   IVPB 1000 milliGRAM(s) IV Intermittent every 24 hours  clopidogrel Tablet 75 milliGRAM(s) Oral daily  diltiazem    milliGRAM(s) Oral daily  DULoxetine 30 milliGRAM(s) Oral daily  heparin  Injectable 5000 Unit(s) SubCutaneous every 12 hours  levothyroxine 50 MICROGram(s) Oral daily  sodium chloride 0.9%. 1000 milliLiter(s) (75 mL/Hr) IV Continuous <Continuous>  sotalol 80 milliGRAM(s) Oral two times a day  tamsulosin 0.4 milliGRAM(s) Oral at bedtime      ASSESSMENT / PLAN:  ----------------------------------------  79M with a history of Alzheimer's dementia, coronary artery disease, atrial fibrillation, hypothyroidism, and ankylosing spondylosis who presented with altered mental status and was found to have an abnormal urinalysis indicative of a urinary tract infection. Intravenous antibiotics were initiated for a urinary tract infection with urine culture ultimately growing Klebsiella.    Urinary tract infection - Urine culture with Klebsiella. Blood cultures were without growth. Afebrile. On ceftriaxone.    Metabolic encephalopathy - Secondary to underlying infection.  Improved.    Chronic kidney disease - Renal function improved on repeat studies.    Atrial fibrillation - On diltiazem and sotalol. Prior history of Watchman procedure noted.    Coronary artery disease - On aspirin, clopidogrel, and atorvastatin. Denied any chest discomfort.    Hypertension - Close blood pressure monitoring.    Hypothyroidism - On levothyroxine.    Thrombocytopenia - Platelet count at similar values in comparison to prior studies. No bleeding noted. ALEKSANDAR HOWARD  ----------------------------------------  The patient was seen and evaluated for urinary tract infection. Offers no complaints. Awake, conversive, eating breakfast.    Vital Signs Last 24 Hrs  T(C): 36.9 (11 Jan 2020 08:24), Max: 36.9 (11 Jan 2020 08:24)  T(F): 98.5 (11 Jan 2020 08:24), Max: 98.5 (11 Jan 2020 08:24)  HR: 68 (11 Jan 2020 08:24) (60 - 80)  BP: 129/65 (11 Jan 2020 08:24) (129/65 - 177/71)  BP(mean): --  RR: 18 (11 Jan 2020 08:24) (17 - 18)  SpO2: 96% (11 Jan 2020 08:24) (96% - 99%)    PHYSICAL EXAMINATION:  ----------------------------------------  General appearance: No acute distress, Awake, Alert  HEENT: Normocephalic, Atraumatic, Conjunctiva clear  Neck: Supple, No JVD, No tenderness  Lungs: Breath sound equal bilaterally, No wheezes, No rales  Cardiovascular: S1S2, Regular rhythm  Abdomen: Soft, Nontender, Nondistended, No guarding/rebound, Positive bowel sounds  Extremities: No clubbing, No cyanosis, No edema, No calf tenderness  Neuro: Strength equal bilaterally, No tremors  Psychiatric: Appropriate mood and affect    MEDICATIONS  (STANDING):  allopurinol 300 milliGRAM(s) Oral daily  aspirin enteric coated 81 milliGRAM(s) Oral daily  atorvastatin 20 milliGRAM(s) Oral at bedtime  cefTRIAXone   IVPB 1000 milliGRAM(s) IV Intermittent every 24 hours  clopidogrel Tablet 75 milliGRAM(s) Oral daily  diltiazem    milliGRAM(s) Oral daily  DULoxetine 30 milliGRAM(s) Oral daily  heparin  Injectable 5000 Unit(s) SubCutaneous every 12 hours  levothyroxine 50 MICROGram(s) Oral daily  sodium chloride 0.9%. 1000 milliLiter(s) (75 mL/Hr) IV Continuous <Continuous>  sotalol 80 milliGRAM(s) Oral two times a day  tamsulosin 0.4 milliGRAM(s) Oral at bedtime      ASSESSMENT / PLAN:  ----------------------------------------  79M with a history of Alzheimer's dementia, coronary artery disease, atrial fibrillation, hypothyroidism, and ankylosing spondylosis who presented with altered mental status and was found to have an abnormal urinalysis indicative of a urinary tract infection. Intravenous antibiotics were initiated for a urinary tract infection with urine culture ultimately growing Klebsiella.    Urinary tract infection - Urine culture with Klebsiella. Blood cultures were without growth. Afebrile. On ceftriaxone.    Metabolic encephalopathy - Secondary to underlying infection.  Improved.    Chronic kidney disease - Renal function improved on repeat studies.    Atrial fibrillation - On diltiazem and sotalol. Prior history of Watchman procedure noted.    Coronary artery disease - On aspirin, clopidogrel, and atorvastatin. Denied any chest discomfort.    Hypertension - Close blood pressure monitoring.    Hypothyroidism - On levothyroxine.    Thrombocytopenia - Platelet count at similar values in comparison to prior studies. No bleeding noted.    The patient's wife reported that she had medical issues herself and was requesting that the patient be transferred to a rehabilitation facility prior to returning home.

## 2020-01-12 PROCEDURE — 99232 SBSQ HOSP IP/OBS MODERATE 35: CPT

## 2020-01-12 RX ORDER — LISINOPRIL 2.5 MG/1
2.5 TABLET ORAL DAILY
Refills: 0 | Status: DISCONTINUED | OUTPATIENT
Start: 2020-01-12 | End: 2020-01-12

## 2020-01-12 RX ORDER — LOSARTAN POTASSIUM 100 MG/1
50 TABLET, FILM COATED ORAL DAILY
Refills: 0 | Status: DISCONTINUED | OUTPATIENT
Start: 2020-01-12 | End: 2020-01-13

## 2020-01-12 RX ORDER — LOSARTAN POTASSIUM 100 MG/1
1 TABLET, FILM COATED ORAL
Qty: 0 | Refills: 0 | DISCHARGE
Start: 2020-01-12

## 2020-01-12 RX ADMIN — ATORVASTATIN CALCIUM 20 MILLIGRAM(S): 80 TABLET, FILM COATED ORAL at 21:08

## 2020-01-12 RX ADMIN — Medication 80 MILLIGRAM(S): at 06:06

## 2020-01-12 RX ADMIN — DULOXETINE HYDROCHLORIDE 30 MILLIGRAM(S): 30 CAPSULE, DELAYED RELEASE ORAL at 12:48

## 2020-01-12 RX ADMIN — Medication 81 MILLIGRAM(S): at 12:48

## 2020-01-12 RX ADMIN — CEFTRIAXONE 100 MILLIGRAM(S): 500 INJECTION, POWDER, FOR SOLUTION INTRAMUSCULAR; INTRAVENOUS at 13:00

## 2020-01-12 RX ADMIN — HEPARIN SODIUM 5000 UNIT(S): 5000 INJECTION INTRAVENOUS; SUBCUTANEOUS at 17:35

## 2020-01-12 RX ADMIN — Medication 300 MILLIGRAM(S): at 12:48

## 2020-01-12 RX ADMIN — CLOPIDOGREL BISULFATE 75 MILLIGRAM(S): 75 TABLET, FILM COATED ORAL at 12:48

## 2020-01-12 RX ADMIN — Medication 180 MILLIGRAM(S): at 06:06

## 2020-01-12 RX ADMIN — SODIUM CHLORIDE 75 MILLILITER(S): 9 INJECTION INTRAMUSCULAR; INTRAVENOUS; SUBCUTANEOUS at 21:07

## 2020-01-12 RX ADMIN — Medication 50 MICROGRAM(S): at 06:06

## 2020-01-12 RX ADMIN — HEPARIN SODIUM 5000 UNIT(S): 5000 INJECTION INTRAVENOUS; SUBCUTANEOUS at 06:06

## 2020-01-12 RX ADMIN — SODIUM CHLORIDE 75 MILLILITER(S): 9 INJECTION INTRAMUSCULAR; INTRAVENOUS; SUBCUTANEOUS at 06:05

## 2020-01-12 RX ADMIN — Medication 80 MILLIGRAM(S): at 17:35

## 2020-01-12 RX ADMIN — TAMSULOSIN HYDROCHLORIDE 0.4 MILLIGRAM(S): 0.4 CAPSULE ORAL at 21:07

## 2020-01-12 NOTE — PROGRESS NOTE ADULT - ASSESSMENT
79M with a history of Alzheimer's dementia, coronary artery disease, atrial fibrillation, hypothyroidism, and ankylosing spondylosis who presented with altered mental status and was found to have an abnormal urinalysis indicative of a urinary tract infection. Intravenous antibiotics were initiated for a urinary tract infection with urine culture ultimately growing Klebsiella. abx course completed inpatient.     Urinary tract infection - Urine culture with Klebsiella. Blood cultures were without growth. Afebrile. resolved sp ceftriaxone.    acute toxic Metabolic encephalopathy - Secondary to underlying infection.  Improved.    Chronic kidney disease 2/3 - stable. baseline 1.3-1.5    Atrial fibrillation - On diltiazem and sotalol. Prior history of Watchman procedure noted.    Hypertension - Close blood pressure monitoring. intermittently uncontrolled, sbp into 170s. asymptomatic. low dose acei. monitor renal function carefully    Thrombocytopenia - Platelet count at similar values in comparison to prior studies. No bleeding noted.    dvt ppx heparin    dispo. PT appreciated - home w/ assist + home PT but family unable to care for her, pending alex. medically stable    outpt fu. pmd 79M with a history of Alzheimer's dementia, coronary artery disease, atrial fibrillation, hypothyroidism, and ankylosing spondylosis who presented with altered mental status and was found to have an abnormal urinalysis indicative of a urinary tract infection. Intravenous antibiotics were initiated for a urinary tract infection with urine culture ultimately growing Klebsiella. abx course completed inpatient.     Urinary tract infection - Urine culture with Klebsiella. Blood cultures were without growth. Afebrile. resolved sp ceftriaxone.    acute toxic Metabolic encephalopathy - resolved    Chronic kidney disease 2/3 - stable. baseline 1.3-1.5    Atrial fibrillation - On diltiazem and sotalol. Prior history of Watchman procedure noted.    Hypertension - Close blood pressure monitoring. intermittently uncontrolled, sbp into 170s. asymptomatic. low dose acei. monitor renal function carefully    Thrombocytopenia - Platelet count at similar values in comparison to prior studies. No bleeding noted.    dvt ppx heparin    dispo. PT appreciated - home w/ assist + home PT but family unable to care for her, pending alex. medically stable    outpt fu. pmd 79M with a history of Alzheimer's dementia, coronary artery disease, atrial fibrillation, hypothyroidism, and ankylosing spondylosis who presented with altered mental status and was found to have an abnormal urinalysis indicative of a urinary tract infection. Intravenous antibiotics were initiated for a urinary tract infection with urine culture ultimately growing Klebsiella. abx course completed inpatient.     Urinary tract infection - Urine culture with Klebsiella. Blood cultures were without growth. Afebrile. resolved sp ceftriaxone.    acute toxic Metabolic encephalopathy - resolved    Chronic kidney disease 2/3 - stable. baseline 1.3-1.5    Atrial fibrillation - On diltiazem and sotalol. Prior history of Watchman procedure noted.    Hypertension - Close blood pressure monitoring. intermittently uncontrolled, sbp into 170s. asymptomatic. on losartan 100mg @ home, resume 1/2 dose, titrate prn    Thrombocytopenia - Platelet count at similar values in comparison to prior studies. No bleeding noted.    dvt ppx heparin    dispo. PT appreciated - home w/ assist + home PT but family unable to care for her, pending alex. medically stable    outpt fu. pmd

## 2020-01-12 NOTE — PROGRESS NOTE ADULT - SUBJECTIVE AND OBJECTIVE BOX
CC:    Patient seen and examined at the bedside. No acute overnight events. - yesterday. Complaining of - today. Denies fever/chills, headache, lightheadedness, dizziness, chest pain, palpitations, shortness of breath, cough, abd pain, nausea/vomiting/diarrhea, muscle pain.      =========================================================================================  T(C): 36.6 (01-12-20 @ 12:45), Max: 36.6 (01-11-20 @ 16:58)  HR: 77 (01-12-20 @ 12:45) (60 - 77)  BP: 165/64 (01-12-20 @ 12:45) (136/77 - 174/80)  RR: 18 (01-12-20 @ 08:47) (18 - 18)  SpO2: 99% (01-12-20 @ 08:47) (97% - 99%)    PHYSICAL EXAM.    GEN - appears age appropriate. well nourished. pleasant. no distress.   HEENT - NCAT, EOMI, MICAH  RESP - CTA BL, no wheeze/stridor/rhonchi/crackles. not on supplemental O2. able to speak in full sentences without distress.   CARDIO - NS1S2, RRR. No murmurs/rubs/gallops.  ABD - Soft/Non tender/Non distended. Normal BS x4 quadrants. no guarding/rebound tenderness.  Ext - No ERIN.  MSK - BL 5/5 strength on upper and lower extremities.   Neuro - AAOx3. cn 2-12 grossly intact  Psych - normal affect  Skin - c/d/i. no rashes/lesions      I&O's Summary    11 Jan 2020 07:01  -  12 Jan 2020 07:00  --------------------------------------------------------  IN: 0 mL / OUT: 800 mL / NET: -800 mL      Daily     Daily     =========================================================================================  LABS.                          =========================================================================================  IMAGING.     =========================================================================================    HOME MEDS.    Home Medications:  allopurinol 300 mg oral tablet: 1 tab(s) orally once a day (10 Cristiano 2020 11:21)  aspirin 325 mg oral tablet: 1 tab(s) orally once a day (10 Cristiano 2020 11:21)  dilTIAZem 180 mg/24 hours oral capsule, extended release: 1 cap(s) orally once a day (10 Cristiano 2020 11:21)  DULoxetine 30 mg oral delayed release capsule: 1 cap(s) orally once a day (10 Cristiano 2020 11:27)  levothyroxine 50 mcg (0.05 mg) oral tablet: 1 tab(s) orally once a day (10 Cristiano 2020 11:25)  Lipitor 20 mg oral tablet: 1 tab(s) orally once a day (10 Cristiano 2020 11:21)  losartan 100 mg oral tablet: 1 tab(s) orally once a day (10 Cristiano 2020 11:21)  Plavix 75 mg oral tablet: 1 tab(s) orally once a day (10 Cristiano 2020 11:21)  sotalol 80 mg oral tablet: 1 tab(s) orally 2 times a day (10 Cristiano 2020 11:21)      =========================================================================================    HOSPITAL MEDS.    MEDICATIONS  (STANDING):  allopurinol 300 milliGRAM(s) Oral daily  aspirin enteric coated 81 milliGRAM(s) Oral daily  atorvastatin 20 milliGRAM(s) Oral at bedtime  clopidogrel Tablet 75 milliGRAM(s) Oral daily  diltiazem    milliGRAM(s) Oral daily  DULoxetine 30 milliGRAM(s) Oral daily  heparin  Injectable 5000 Unit(s) SubCutaneous every 12 hours  levothyroxine 50 MICROGram(s) Oral daily  sodium chloride 0.9%. 1000 milliLiter(s) (75 mL/Hr) IV Continuous <Continuous>  sotalol 80 milliGRAM(s) Oral two times a day  tamsulosin 0.4 milliGRAM(s) Oral at bedtime    MEDICATIONS  (PRN): CC: weakness    Patient seen and examined at the bedside. No acute overnight events. no events yesterday. Complaining of weakness + is sp fall w/o any major trauma, injury, pain today. Denies fever/chills, headache, lightheadedness, dizziness, chest pain, palpitations, shortness of breath, cough, abd pain, nausea/vomiting/diarrhea, muscle pain.      =========================================================================================  T(C): 36.6 (01-12-20 @ 12:45), Max: 36.6 (01-11-20 @ 16:58)  HR: 77 (01-12-20 @ 12:45) (60 - 77)  BP: 165/64 (01-12-20 @ 12:45) (136/77 - 174/80)  RR: 18 (01-12-20 @ 08:47) (18 - 18)  SpO2: 99% (01-12-20 @ 08:47) (97% - 99%)    PHYSICAL EXAM.    GEN - appears age appropriate. well nourished. pleasant. no distress.   HEENT - NCAT, EOMI, MIACH  RESP - CTA BL, no wheeze/stridor/rhonchi/crackles. not on supplemental O2. able to speak in full sentences without distress.   CARDIO - NS1S2, RRR. No murmurs/rubs/gallops.  ABD - Soft/Non tender/Non distended. Normal BS x4 quadrants. no guarding/rebound tenderness.  Ext - No ERIN.  Neuro - AAOx3. cn 2-12 grossly intact  Psych - normal affect  Skin - c/d/i. no rashes/lesions      I&O's Summary    11 Jan 2020 07:01  -  12 Jan 2020 07:00  --------------------------------------------------------  IN: 0 mL / OUT: 800 mL / NET: -800 mL      Daily     Daily     =========================================================================================  LABS.                          =========================================================================================  IMAGING.     =========================================================================================    HOME MEDS.    Home Medications:  allopurinol 300 mg oral tablet: 1 tab(s) orally once a day (10 Cristiano 2020 11:21)  aspirin 325 mg oral tablet: 1 tab(s) orally once a day (10 Cristiano 2020 11:21)  dilTIAZem 180 mg/24 hours oral capsule, extended release: 1 cap(s) orally once a day (10 Cristiano 2020 11:21)  DULoxetine 30 mg oral delayed release capsule: 1 cap(s) orally once a day (10 Cristiano 2020 11:27)  levothyroxine 50 mcg (0.05 mg) oral tablet: 1 tab(s) orally once a day (10 Cristiano 2020 11:25)  Lipitor 20 mg oral tablet: 1 tab(s) orally once a day (10 Cristiano 2020 11:21)  losartan 100 mg oral tablet: 1 tab(s) orally once a day (10 Cristiano 2020 11:21)  Plavix 75 mg oral tablet: 1 tab(s) orally once a day (10 Cristiano 2020 11:21)  sotalol 80 mg oral tablet: 1 tab(s) orally 2 times a day (10 Cristiano 2020 11:21)      =========================================================================================    HOSPITAL MEDS.    MEDICATIONS  (STANDING):  allopurinol 300 milliGRAM(s) Oral daily  aspirin enteric coated 81 milliGRAM(s) Oral daily  atorvastatin 20 milliGRAM(s) Oral at bedtime  clopidogrel Tablet 75 milliGRAM(s) Oral daily  diltiazem    milliGRAM(s) Oral daily  DULoxetine 30 milliGRAM(s) Oral daily  heparin  Injectable 5000 Unit(s) SubCutaneous every 12 hours  levothyroxine 50 MICROGram(s) Oral daily  sodium chloride 0.9%. 1000 milliLiter(s) (75 mL/Hr) IV Continuous <Continuous>  sotalol 80 milliGRAM(s) Oral two times a day  tamsulosin 0.4 milliGRAM(s) Oral at bedtime    MEDICATIONS  (PRN):

## 2020-01-13 VITALS
OXYGEN SATURATION: 97 % | SYSTOLIC BLOOD PRESSURE: 142 MMHG | HEART RATE: 60 BPM | TEMPERATURE: 98 F | DIASTOLIC BLOOD PRESSURE: 67 MMHG | RESPIRATION RATE: 18 BRPM

## 2020-01-13 LAB
ANION GAP SERPL CALC-SCNC: 11 MMOL/L — SIGNIFICANT CHANGE UP (ref 5–17)
BUN SERPL-MCNC: 21 MG/DL — HIGH (ref 8–20)
CALCIUM SERPL-MCNC: 8.8 MG/DL — SIGNIFICANT CHANGE UP (ref 8.6–10.2)
CHLORIDE SERPL-SCNC: 102 MMOL/L — SIGNIFICANT CHANGE UP (ref 98–107)
CO2 SERPL-SCNC: 22 MMOL/L — SIGNIFICANT CHANGE UP (ref 22–29)
CREAT SERPL-MCNC: 1.22 MG/DL — SIGNIFICANT CHANGE UP (ref 0.5–1.3)
GLUCOSE SERPL-MCNC: 82 MG/DL — SIGNIFICANT CHANGE UP (ref 70–115)
POTASSIUM SERPL-MCNC: 3.8 MMOL/L — SIGNIFICANT CHANGE UP (ref 3.5–5.3)
POTASSIUM SERPL-SCNC: 3.8 MMOL/L — SIGNIFICANT CHANGE UP (ref 3.5–5.3)
SODIUM SERPL-SCNC: 135 MMOL/L — SIGNIFICANT CHANGE UP (ref 135–145)

## 2020-01-13 PROCEDURE — 87486 CHLMYD PNEUM DNA AMP PROBE: CPT

## 2020-01-13 PROCEDURE — 83605 ASSAY OF LACTIC ACID: CPT

## 2020-01-13 PROCEDURE — 99239 HOSP IP/OBS DSCHRG MGMT >30: CPT

## 2020-01-13 PROCEDURE — 97163 PT EVAL HIGH COMPLEX 45 MIN: CPT

## 2020-01-13 PROCEDURE — 87581 M.PNEUMON DNA AMP PROBE: CPT

## 2020-01-13 PROCEDURE — 80053 COMPREHEN METABOLIC PANEL: CPT

## 2020-01-13 PROCEDURE — 87633 RESP VIRUS 12-25 TARGETS: CPT

## 2020-01-13 PROCEDURE — 96365 THER/PROPH/DIAG IV INF INIT: CPT

## 2020-01-13 PROCEDURE — 99285 EMERGENCY DEPT VISIT HI MDM: CPT | Mod: 25

## 2020-01-13 PROCEDURE — 84443 ASSAY THYROID STIM HORMONE: CPT

## 2020-01-13 PROCEDURE — 80048 BASIC METABOLIC PNL TOTAL CA: CPT

## 2020-01-13 PROCEDURE — 87040 BLOOD CULTURE FOR BACTERIA: CPT

## 2020-01-13 PROCEDURE — 96361 HYDRATE IV INFUSION ADD-ON: CPT

## 2020-01-13 PROCEDURE — 70450 CT HEAD/BRAIN W/O DYE: CPT

## 2020-01-13 PROCEDURE — 85027 COMPLETE CBC AUTOMATED: CPT

## 2020-01-13 PROCEDURE — 71045 X-RAY EXAM CHEST 1 VIEW: CPT

## 2020-01-13 PROCEDURE — 93005 ELECTROCARDIOGRAM TRACING: CPT

## 2020-01-13 PROCEDURE — 87086 URINE CULTURE/COLONY COUNT: CPT

## 2020-01-13 PROCEDURE — 36415 COLL VENOUS BLD VENIPUNCTURE: CPT

## 2020-01-13 PROCEDURE — 87186 SC STD MICRODIL/AGAR DIL: CPT

## 2020-01-13 PROCEDURE — 87798 DETECT AGENT NOS DNA AMP: CPT

## 2020-01-13 PROCEDURE — 83735 ASSAY OF MAGNESIUM: CPT

## 2020-01-13 PROCEDURE — 81001 URINALYSIS AUTO W/SCOPE: CPT

## 2020-01-13 RX ADMIN — Medication 300 MILLIGRAM(S): at 12:49

## 2020-01-13 RX ADMIN — Medication 50 MICROGRAM(S): at 05:25

## 2020-01-13 RX ADMIN — LOSARTAN POTASSIUM 50 MILLIGRAM(S): 100 TABLET, FILM COATED ORAL at 05:25

## 2020-01-13 RX ADMIN — DULOXETINE HYDROCHLORIDE 30 MILLIGRAM(S): 30 CAPSULE, DELAYED RELEASE ORAL at 12:49

## 2020-01-13 RX ADMIN — Medication 180 MILLIGRAM(S): at 05:26

## 2020-01-13 RX ADMIN — Medication 80 MILLIGRAM(S): at 05:26

## 2020-01-13 RX ADMIN — Medication 81 MILLIGRAM(S): at 12:49

## 2020-01-13 RX ADMIN — CLOPIDOGREL BISULFATE 75 MILLIGRAM(S): 75 TABLET, FILM COATED ORAL at 12:49

## 2020-01-13 RX ADMIN — HEPARIN SODIUM 5000 UNIT(S): 5000 INJECTION INTRAVENOUS; SUBCUTANEOUS at 05:25

## 2021-10-09 NOTE — DISCHARGE NOTE ADULT - CAREGIVER ADDRESS
Patient: Kwasi Castellanos    Procedure: Procedure(s):  CYSTOSCOPY, Urethral Dilation and Fitch Placement       Diagnosis:Abscess [L02.91]  Diagnosis Additional Information: No value filed.    Anesthesia Type:  General    Note:  Disposition: Inpatient   Postop Pain Control: Uneventful            Sign Out: Well controlled pain   PONV:    Neuro/Psych: Uneventful            Sign Out: Acceptable/Baseline neuro status   Airway/Respiratory: Uneventful            Sign Out: Acceptable/Baseline resp. status   CV/Hemodynamics: Uneventful            Sign Out: Acceptable CV status; No obvious hypovolemia; No obvious fluid overload   Other NRE:    DID A NON-ROUTINE EVENT OCCUR?            Last vitals:  Vitals Value Taken Time   /69 10/09/21 1415   Temp     Pulse 68 10/09/21 1427   Resp 12 10/09/21 1427   SpO2 96 % 10/09/21 1427   Vitals shown include unvalidated device data.    Electronically Signed By: Jj Bazan MD  October 9, 2021  2:29 PM   [address] [place], [place] [id number]

## 2022-08-12 NOTE — ED ADULT TRIAGE NOTE - ESI TRIAGE ACUITY LEVEL, MLM
Please drink plenty of fluids and stay well-hydrated. Eat multiple small meals throughout the day to keep your strength up. Make sure you check your blood pressure prior to taking your blood pressure medication. If your systolic blood pressure (the top number) is less than 100 do not take your medication for your blood pressure. Please return immediately to the emergency department should you develop chest pain, shortness of breath, lightheadedness, dizziness or if you pass out. Please follow-up with your primary care doctor for ER follow-up visit.
2

## 2023-01-10 NOTE — PROGRESS NOTE ADULT - PROBLEM SELECTOR PLAN 3
Denies loss of consciousness, no reported seizure like activity from nursing unit, no evidence tongue bite/incontinence   - monitor for seizure activity  - continue PTA zonisamide 100mg Appears euvolemic currently  Atenolol 50mg PO QD  Hold Losartan 50mg PO QD due to PJ

## 2023-04-21 NOTE — PATIENT PROFILE ADULT. - PAIN CHRONIC, PROFILE
[General Appearance - Alert] : alert [General Appearance - In No Acute Distress] : in no acute distress [Sclera] : the sclera and conjunctiva were normal [Outer Ear] : the ears and nose were normal in appearance [Neck Appearance] : the appearance of the neck was normal [Auscultation Breath Sounds / Voice Sounds] : lungs were clear to auscultation bilaterally [Heart Rate And Rhythm] : heart rate was normal and rhythm regular [Heart Sounds] : normal S1 and S2 [Abnormal Walk] : normal gait [Nail Clubbing] : no clubbing  or cyanosis of the fingernails [Musculoskeletal - Swelling] : no joint swelling seen [FreeTextEntry1] : mild shoulder discomfort with max ext/flex [Motor Tone] : muscle strength and tone were normal [] : no rash [No Focal Deficits] : no focal deficits [Oriented To Time, Place, And Person] : oriented to person, place, and time yes

## 2023-11-28 NOTE — ED ADULT NURSE NOTE - ATTEMPT TO OOB
-- DO NOT REPLY / DO NOT REPLY ALL --  -- Message is from [facility]agement [facility] [employer] (ECO) --    ONLY TO BE USED WITHIN A REFILL MEDICATION ENCOUNTER    Med Refill  Is the patient currently having any symptoms?: Not Applicable, Pharmacy calling for refill    Name of medication requested: See pended med - VERBAL TO NUMBER LISTED BELOW OR  FAX TO: [phone] - NEEDS TO BE A 90 DAY SUPPLY    Has patient contacted the pharmacy? Yes    Is this the first request for the medication in the last 48 hours?: Yes      Patient is requesting a medication refill - medication is on active list      Full name of the provider who ordered the medication: [facility] site name / Account # for provider: michell michael    Preferred Pharmacy: Could not locate pharmacy in [facility].  Pharmacy name: [email or web address]  Pharmacy phone: [phone]    Caller Information       Type Contact Phone/Fax    11/28/2023 10:21 AM CST Phone (Incoming) Megan (Other) [phone]     [email or web address]          Alternative phone number: no    Can a detailed message be left?: Yes    Patient is completely out of medication: Verify if patient is currently experiencing symptoms. If patient is symptomatic, proceed with front end triage instead of medication refill. If patient is not symptomatic but is completely out of medication, oswald as High priority when routing. Inform patient: “Please call back with any questions or concerns and if your condition becomes life threatening, you should seek immediate medical assistance by calling 911 or going to the Emergency Department for evaluation.”    Inform all patients: \"If the clinical team needs to contact you regarding this refill, please be aware the return phone call may come from an unidentified or out of state phone number and your refill request will be addressed as soon as the clinical team reviews your message.\"  
Medication refill requested for empagliflozin (Jardiance) 25 MG tablet  Last Refill/Prescribed: Date 11/16/2023, # of tablets: 30, # of refills approved:3     Script was sent to [facility] on 11/16 with confirmation receipt, different pharmacy is calling in regard to med and no requested pharmacy is pended, unable to contact requesting pharmacy, routed to provider's office for further review   
no

## 2023-12-06 NOTE — H&P ADULT - CLICK TO LAUNCH ORM
Social Work Assessment  Questions/Answers      Flowsheet Row Most Recent Value   Referral Source patient   Reason for Consult community resources   Preferred Language English   People in Home alone   Current Living Arrangements home   Potentially Unsafe Housing Conditions none   Primary Care Provided by self   Provides Primary Care For no one   Family Caregiver if Needed none   Quality of Family Relationships supportive   Source of Income social security   Application for Public Assistance obtained public assistance pending number        SDOH updated and reviewed with the patient during this program:  Financial Resource Strain: Low Risk  (12/1/2023)    Overall Financial Resource Strain (CARDIA)     Difficulty of Paying Living Expenses: Not very hard      Food Insecurity: Food Insecurity Present (12/1/2023)    Hunger Vital Sign     Worried About Running Out of Food in the Last Year: Never true     Ran Out of Food in the Last Year: Sometimes true      Transportation Needs: No Transportation Needs (12/1/2023)    PRAPARE - Transportation     Lack of Transportation (Medical): No     Lack of Transportation (Non-Medical): No      Housing Stability: Not At Risk (12/6/2023)    Housing Stability     Current Living Arrangements: home     Potentially Unsafe Housing Conditions: none      Continuing Care   [facility] MEDICA[facility]    [facility] [id number]    Phone: [phone]    Resource for: Financial Resource Strain   Patient Outreach    SW spoke with pt to F/u on his self-referral for in-home support with some personal chores. SW discussed in-home support services as a possible additional benefit with his Medicare adv plan. SW encouraged him to contact the Member Service hotline and inquire about this. Pt stated he feel's equipped to make the call himself. SW will F/u in a week to assess for barriers and monitor progress. LAWANDA will also educate pt on waiver services. Pt expressed thanks  for the outreach.     Orlando GARCIA -   Ambulatory Case Management    12/6/2023, 10:14 EST     .

## 2024-06-28 NOTE — PROGRESS NOTE ADULT - REASON FOR ADMISSION
Patient received Cycle 1 Taxotere + Cytoxan and Neulasta OnPro on 6/24/24.    Patient went to [facility] ED this morning due to generalized body aches + fever of 100.4.    Labs completed (including blood cultures and UA/UC).    CT A/P + CXR completed.    UA positive for urine nitrites.  -Patient completed 5 day course of Cipro (6/20/24-6/25/24) for UTI.    ED physician ordered Cefpodoxime BID x 10 Days for UTI.    Patient discharged to home.    Writer routed this message to Martin Macias PA-C to review and advise if needs to be seen in clinic today for follow up.    Priscila Leong, RN,BSN,OCN on 6/28/2024 at 9:07 AM     UTI with confusion

## 2024-09-26 NOTE — CONSULT NOTE ADULT - NSHPATTENDINGPLANDISCUSS_GEN_ALL_CORE
Initiate Treatment: Valacyclovir 1g every 8 hours for 10 days\\nTriamcinolone .025% ointment BID for up to 2 weeks Render In Strict Bullet Format?: No Detail Level: Zone Dr Ramos

## 2024-11-12 NOTE — PATIENT PROFILE ADULT - BRADEN NUTRITION
[facility] HAVE QUESTIONS REGARDING THE REFERRAL FOR OT. THEY DONT HAVE ANY OPENINGS FOR OT JUST PT     PLEASE CALL BACK TO ADVISE    
Left voice message for [facility] to call back.  
Tried to contact [facility], but they were already closed.  
(2) probably inadequate

## 2025-03-17 NOTE — ED PROVIDER NOTE - CROS ED SKIN ALL NEG
"Kailash Sorensen Cobb (surgical oncology) at [facility] Lela Higgins, APRN-CNP       - - -